# Patient Record
Sex: FEMALE | Race: WHITE | NOT HISPANIC OR LATINO | Employment: PART TIME | URBAN - METROPOLITAN AREA
[De-identification: names, ages, dates, MRNs, and addresses within clinical notes are randomized per-mention and may not be internally consistent; named-entity substitution may affect disease eponyms.]

---

## 2017-08-14 ENCOUNTER — APPOINTMENT (OUTPATIENT)
Dept: URGENT CARE | Facility: CLINIC | Age: 37
End: 2017-08-14
Payer: OTHER MISCELLANEOUS

## 2017-08-14 ENCOUNTER — TRANSCRIBE ORDERS (OUTPATIENT)
Dept: URGENT CARE | Facility: CLINIC | Age: 37
End: 2017-08-14

## 2017-08-14 ENCOUNTER — APPOINTMENT (OUTPATIENT)
Dept: RADIOLOGY | Facility: CLINIC | Age: 37
End: 2017-08-14
Payer: OTHER MISCELLANEOUS
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
## 2017-08-14 PROCEDURE — 72072 X-RAY EXAM THORAC SPINE 3VWS: CPT

## 2017-08-14 PROCEDURE — 99284 EMERGENCY DEPT VISIT MOD MDM: CPT

## 2017-08-14 PROCEDURE — 72040 X-RAY EXAM NECK SPINE 2-3 VW: CPT

## 2017-08-14 PROCEDURE — G0383 LEV 4 HOSP TYPE B ED VISIT: HCPCS

## 2017-08-17 ENCOUNTER — APPOINTMENT (OUTPATIENT)
Dept: URGENT CARE | Facility: CLINIC | Age: 37
End: 2017-08-17
Payer: OTHER MISCELLANEOUS

## 2017-08-17 PROCEDURE — 99213 OFFICE O/P EST LOW 20 MIN: CPT

## 2017-08-24 ENCOUNTER — APPOINTMENT (OUTPATIENT)
Dept: URGENT CARE | Facility: CLINIC | Age: 37
End: 2017-08-24
Payer: OTHER MISCELLANEOUS

## 2017-08-24 PROCEDURE — 99213 OFFICE O/P EST LOW 20 MIN: CPT

## 2017-08-31 ENCOUNTER — ALLSCRIPTS OFFICE VISIT (OUTPATIENT)
Dept: OTHER | Facility: OTHER | Age: 37
End: 2017-08-31

## 2018-01-13 VITALS
DIASTOLIC BLOOD PRESSURE: 80 MMHG | HEIGHT: 68 IN | WEIGHT: 212 LBS | HEART RATE: 70 BPM | BODY MASS INDEX: 32.13 KG/M2 | OXYGEN SATURATION: 97 % | RESPIRATION RATE: 16 BRPM | SYSTOLIC BLOOD PRESSURE: 130 MMHG

## 2021-03-08 ENCOUNTER — OFFICE VISIT (OUTPATIENT)
Dept: FAMILY MEDICINE CLINIC | Facility: CLINIC | Age: 41
End: 2021-03-08
Payer: COMMERCIAL

## 2021-03-08 VITALS
HEART RATE: 93 BPM | BODY MASS INDEX: 32.86 KG/M2 | WEIGHT: 216.8 LBS | OXYGEN SATURATION: 99 % | TEMPERATURE: 98.1 F | HEIGHT: 68 IN | SYSTOLIC BLOOD PRESSURE: 124 MMHG | DIASTOLIC BLOOD PRESSURE: 70 MMHG

## 2021-03-08 DIAGNOSIS — J45.40 MODERATE PERSISTENT ASTHMA, UNSPECIFIED WHETHER COMPLICATED: ICD-10-CM

## 2021-03-08 DIAGNOSIS — E78.2 MIXED HYPERLIPIDEMIA: ICD-10-CM

## 2021-03-08 DIAGNOSIS — Z00.00 WELLNESS EXAMINATION: Primary | ICD-10-CM

## 2021-03-08 PROCEDURE — 3725F SCREEN DEPRESSION PERFORMED: CPT | Performed by: FAMILY MEDICINE

## 2021-03-08 PROCEDURE — 99395 PREV VISIT EST AGE 18-39: CPT | Performed by: FAMILY MEDICINE

## 2021-03-08 PROCEDURE — 99213 OFFICE O/P EST LOW 20 MIN: CPT | Performed by: FAMILY MEDICINE

## 2021-03-08 RX ORDER — ALBUTEROL SULFATE 90 UG/1
2 AEROSOL, METERED RESPIRATORY (INHALATION) EVERY 6 HOURS PRN
Qty: 1 INHALER | Refills: 5 | Status: SHIPPED | OUTPATIENT
Start: 2021-03-08 | End: 2022-05-15

## 2021-03-08 RX ORDER — FEXOFENADINE HCL AND PSEUDOEPHEDRINE HCI 180; 240 MG/1; MG/1
1 TABLET, EXTENDED RELEASE ORAL DAILY
COMMUNITY

## 2021-03-08 NOTE — PROGRESS NOTES
[facility]        NAME: Eleni Pa is a 46 y o  female  : 1980    MRN: [id number]  DATE: 2021  TIME: 9:26 AM    Assessment and Plan   Wellness examination [Z00 00]  1  Wellness examination     2  Moderate persistent asthma, unspecified whether complicated     3  Mixed hyperlipidemia  Comprehensive metabolic panel    Lipid Panel with Direct LDL reflex    Comprehensive metabolic panel    Lipid Panel with Direct LDL reflex         Patient Instructions     Patient Instructions   Trial advair--ck labs          Chief Complaint     Chief Complaint   Patient presents with    Follow-up     MM--med-refills--Asthma    Annual Exam     HM         History of Present Illness       F/u assessed med cond      Review of Systems   Review of Systems   Constitutional: Negative for fatigue, fever and unexpected weight change  HENT: Negative for congestion, sinus pain and sore throat  Eyes: Negative for visual disturbance  Respiratory: Negative for shortness of breath and wheezing  Cardiovascular: Negative for chest pain and palpitations  Gastrointestinal: Negative for abdominal pain, nausea and vomiting  Musculoskeletal: Negative  Negative for arthralgias and myalgias  Neurological: Negative for syncope, weakness and numbness  Psychiatric/Behavioral: Negative  Negative for confusion, dysphoric mood and suicidal ideas           Current Medications       Current Outpatient Medications:     albuterol (2 5 mg/3 mL) 0 083 % nebulizer solution, Take 3 mL by nebulization every 6 (six) hours as needed for wheezing, Disp: 75 mL, Rfl: 12    albuterol (PROVENTIL HFA,VENTOLIN HFA) 90 mcg/act inhaler, Inhale 2 puffs every 6 (six) hours as needed for wheezing, Disp: , Rfl:     cetirizine (ZyrTEC) 10 mg tablet, Take 10 mg by mouth daily, Disp: , Rfl:     fexofenadine-pseudoephedrine (ALLEGRA-D 24) 180-240 MG per 24 hr tablet, Take 1 tablet by mouth daily, Disp: , Rfl: Current Allergies     Allergies as of 03/08/2021    (No Known Allergies)            The following portions of the patient's history were reviewed and updated as appropriate: allergies, current medications, past family history, past medical history, past social history, past surgical history and problem list      Past Medical History:   Diagnosis Date    Asthma        No past surgical history on file  Family History   Problem Relation Age of Onset    Diabetes Mother     Hypertension Sister     Coronary artery disease Sister     Breast cancer Maternal Grandmother          Medications have been verified  Objective   /70   Pulse 93   Temp 98 1 °F (36 7 °C) (Temporal)   Ht 5' 7 5" (1 715 m)   Wt 98 3 kg (216 lb 12 8 oz)   SpO2 99%   BMI 33 45 kg/m²        Physical Exam     Physical Exam  Constitutional:       Appearance: She is well-developed  HENT:      Right Ear: Ear canal normal  Tympanic membrane is not injected  Left Ear: Ear canal normal  Tympanic membrane is not injected  Nose: Nose normal    Eyes:      General:         Right eye: No discharge  Left eye: No discharge  Conjunctiva/sclera: Conjunctivae normal       Pupils: Pupils are equal, round, and reactive to light  Neck:      Musculoskeletal: Normal range of motion and neck supple  Thyroid: No thyromegaly  Cardiovascular:      Rate and Rhythm: Normal rate and regular rhythm  Heart sounds: Normal heart sounds  No murmur  Pulmonary:      Effort: Pulmonary effort is normal  No respiratory distress  Breath sounds: Normal breath sounds  No wheezing  Abdominal:      General: Bowel sounds are normal  There is no distension  Palpations: Abdomen is soft  Tenderness: There is no abdominal tenderness  Musculoskeletal: Normal range of motion  Lymphadenopathy:      Cervical: No cervical adenopathy  Skin:     General: Skin is warm and dry     Neurological:      Mental Status: She is alert and oriented to person, place, and time  She is not disoriented  Sensory: No sensory deficit  Gait: Gait normal       Deep Tendon Reflexes: Reflexes are normal and symmetric  Psychiatric:         Speech: Speech normal          Behavior: Behavior normal          Thought Content:  Thought content normal          Judgment: Judgment normal

## 2021-03-08 NOTE — PROGRESS NOTES
HPI:  Eleni Pa is a 46 y o  female here for her yearly health maintenance exam    Patient Active Problem List   Diagnosis    Asthma     Past Medical History:   Diagnosis Date    Asthma        1  Advanced Directive: n     2  Durable Power of  for Healthcare: n     3  Social History:           Drug and alcohol History: n                  4  Immunizations up to date: y                 Lifestyle:                           Healthy Diet:y                          Alcohol Use:n                          Tobacco Use:n                          Regular exercise:y                          Weight concerns:y                               5  Over the past 2 weeks, how often have you been bothered by the following:              Little interest or pleasure in doing things:n              Felling down, depressed or hopeless:n       Current Outpatient Medications   Medication Sig Dispense Refill    albuterol (2 5 mg/3 mL) 0 083 % nebulizer solution Take 3 mL by nebulization every 6 (six) hours as needed for wheezing 75 mL 12    albuterol (PROVENTIL HFA,VENTOLIN HFA) 90 mcg/act inhaler Inhale 2 puffs every 6 (six) hours as needed for wheezing      cetirizine (ZyrTEC) 10 mg tablet Take 10 mg by mouth daily      fexofenadine-pseudoephedrine (ALLEGRA-D 24) 180-240 MG per 24 hr tablet Take 1 tablet by mouth daily       No current facility-administered medications for this visit  No Known Allergies  There is no immunization history for the selected administration types on file for this patient  Patient Care Team:  Lui Pascual MD as PCP - General (Family Medicine)    Review of Systems    Physical Exam :  Physical Exam      Assessment and Plan:  1  Wellness examination     2  Moderate persistent asthma, unspecified whether complicated     3   Mixed hyperlipidemia  Comprehensive metabolic panel    Lipid Panel with Direct LDL reflex    Comprehensive metabolic panel    Lipid Panel with Direct LDL reflex Health Maintenance Due   Topic Date Due    BMI: Followup Plan  03/08/2000    Annual Physical  03/08/2000    DTaP,Tdap,and Td Vaccines (1 - Tdap) 03/08/2003    Cervical Cancer Screening  03/09/2018 no

## 2021-03-09 LAB
ALBUMIN SERPL-MCNC: 4.4 G/DL (ref 3.8–4.8)
ALBUMIN/GLOB SERPL: 1.6 {RATIO} (ref 1.2–2.2)
ALP SERPL-CCNC: 34 IU/L (ref 39–117)
ALT SERPL-CCNC: 14 IU/L (ref 0–32)
AST SERPL-CCNC: 18 IU/L (ref 0–40)
BILIRUB SERPL-MCNC: 0.4 MG/DL (ref 0–1.2)
BUN SERPL-MCNC: 17 MG/DL (ref 6–20)
BUN/CREAT SERPL: 19 (ref 9–23)
CALCIUM SERPL-MCNC: 9.7 MG/DL (ref 8.7–10.2)
CHLORIDE SERPL-SCNC: 105 MMOL/L (ref 96–106)
CHOLEST SERPL-MCNC: 153 MG/DL (ref 100–199)
CO2 SERPL-SCNC: 25 MMOL/L (ref 20–29)
CREAT SERPL-MCNC: 0.88 MG/DL (ref 0.57–1)
GLOBULIN SER-MCNC: 2.8 G/DL (ref 1.5–4.5)
GLUCOSE SERPL-MCNC: 105 MG/DL (ref 65–99)
HDLC SERPL-MCNC: 65 MG/DL
LDLC SERPL CALC-MCNC: 77 MG/DL (ref 0–99)
LDLC/HDLC SERPL: 1.2 RATIO (ref 0–3.2)
POTASSIUM SERPL-SCNC: 4.8 MMOL/L (ref 3.5–5.2)
PROT SERPL-MCNC: 7.2 G/DL (ref 6–8.5)
SL AMB EGFR AFRICAN AMERICAN: 96 ML/MIN/1.73
SL AMB EGFR NON AFRICAN AMERICAN: 83 ML/MIN/1.73
SL AMB VLDL CHOLESTEROL CALC: 11 MG/DL (ref 5–40)
SODIUM SERPL-SCNC: 140 MMOL/L (ref 134–144)
TRIGL SERPL-MCNC: 50 MG/DL (ref 0–149)

## 2021-03-10 ENCOUNTER — TELEPHONE (OUTPATIENT)
Dept: FAMILY MEDICINE CLINIC | Facility: CLINIC | Age: 41
End: 2021-03-10

## 2021-03-10 NOTE — TELEPHONE ENCOUNTER
----- Message from Johan Loera MD sent at 3/10/2021  7:30 AM EST -----  MM/LABS 12MOS---current labs NL

## 2021-03-25 ENCOUNTER — OFFICE VISIT (OUTPATIENT)
Dept: FAMILY MEDICINE CLINIC | Facility: CLINIC | Age: 41
End: 2021-03-25
Payer: COMMERCIAL

## 2021-03-25 VITALS
HEIGHT: 68 IN | DIASTOLIC BLOOD PRESSURE: 72 MMHG | BODY MASS INDEX: 32.89 KG/M2 | TEMPERATURE: 98.2 F | OXYGEN SATURATION: 96 % | WEIGHT: 217 LBS | HEART RATE: 93 BPM | SYSTOLIC BLOOD PRESSURE: 122 MMHG

## 2021-03-25 DIAGNOSIS — S86.911A STRAIN OF RIGHT KNEE, INITIAL ENCOUNTER: Primary | ICD-10-CM

## 2021-03-25 DIAGNOSIS — M71.21 SYNOVIAL CYST OF RIGHT POPLITEAL SPACE: ICD-10-CM

## 2021-03-25 PROCEDURE — 1036F TOBACCO NON-USER: CPT | Performed by: FAMILY MEDICINE

## 2021-03-25 PROCEDURE — 99213 OFFICE O/P EST LOW 20 MIN: CPT | Performed by: FAMILY MEDICINE

## 2021-03-25 PROCEDURE — 3008F BODY MASS INDEX DOCD: CPT | Performed by: FAMILY MEDICINE

## 2021-03-25 RX ORDER — PREDNISONE 20 MG/1
TABLET ORAL
Qty: 15 TABLET | Refills: 0 | Status: SHIPPED | OUTPATIENT
Start: 2021-03-25

## 2021-03-25 NOTE — PROGRESS NOTES
[address]        NAME: Denia Rahman is a 46 y o  female  : 1980    MRN: [id number]  DATE: 2021  TIME: 11:27 AM    Assessment and Plan   Strain of right knee, initial encounter [S86 911A]  1  Strain of right knee, initial encounter  XR knee 3 vw right non injury    predniSONE 20 mg tablet   2  Synovial cyst of right popliteal space  XR knee 3 vw right non injury    predniSONE 20 mg tablet   3  BMI 32 0-32 9,adult         No problem-specific Assessment & Plan notes found for this encounter  Patient Instructions     Patient Instructions     This appears to be an overuse strain of your right knee  There is also possibly of a Baker cyst behind the knee  I would give trial of ibuprofen 600 mg 3 times daily for 3-4 days  If her seeing no difference than get the prescription filled for the prednisone  If that still does not solve the problem, I would check an x-ray and refer you to orthopedics  Weight Management   AMBULATORY CARE:   Why it is important to manage your weight:  Being overweight increases your risk of health conditions such as heart disease, high blood pressure, type 2 diabetes, and certain types of cancer  It can also increase your risk for osteoarthritis, sleep apnea, and other respiratory problems  Aim for a slow, steady weight loss  Even a small amount of weight loss can lower your risk of health problems  How to lose weight safely:  A safe and healthy way to lose weight is to eat fewer calories and get regular exercise  · You can lose up about 1 pound a week by decreasing the number of calories you eat by 500 calories each day  You can decrease calories by eating smaller portion sizes or by cutting out high-calorie foods  Read labels to find out how many calories are in the foods you eat  · You can also burn calories with exercise such as walking, swimming, or biking   You will be more likely to keep weight off if you make these changes part of your lifestyle  Exercise at least 30 minutes per day on most days of the week  You can also fit in more physical activity by taking the stairs instead of the elevator or parking farther away from stores  Ask your healthcare provider about the best exercise plan for you  Healthy meal plan for weight management:  A healthy meal plan includes a variety of foods, contains fewer calories, and helps you stay healthy  A healthy meal plan includes the following:     · Eat whole-grain foods more often  A healthy meal plan should contain fiber  Fiber is the part of grains, fruits, and vegetables that is not broken down by your body  Whole-grain foods are healthy and provide extra fiber in your diet  Some examples of whole-grain foods are whole-wheat breads and pastas, oatmeal, brown rice, and bulgur  · Eat a variety of vegetables every day  Include dark, leafy greens such as spinach, kale, hayley greens, and mustard greens  Eat yellow and orange vegetables such as carrots, sweet potatoes, and winter squash  · Eat a variety of fruits every day  Choose fresh or canned fruit (canned in its own juice or light syrup) instead of juice  Fruit juice has very little or no fiber  · Eat low-fat dairy foods  Drink fat-free (skim) milk or 1% milk  Eat fat-free yogurt and low-fat cottage cheese  Try low-fat cheeses such as mozzarella and other reduced-fat cheeses  · Choose meat and other protein foods that are low in fat  Choose beans or other legumes such as split peas or lentils  Choose fish, skinless poultry (chicken or turkey), or lean cuts of red meat (beef or pork)  Before you cook meat or poultry, cut off any visible fat  · Use less fat and oil  Try baking foods instead of frying them  Add less fat, such as margarine, sour cream, regular salad dressing and mayonnaise to foods  Eat fewer high-fat foods   Some examples of high-fat foods include french fries, doughnuts, ice cream, and cakes     · Eat fewer sweets  Limit foods and drinks that are high in sugar  This includes candy, cookies, regular soda, and sweetened drinks  Ways to decrease calories:   · Eat smaller portions  ? Use a small plate with smaller servings  ? Do not eat second helpings  ? When you eat at a restaurant, ask for a box and place half of your meal in the box before you eat  ? Share an entrée with someone else  · Replace high-calorie snacks with healthy, low-calorie snacks  ? Choose fresh fruit, vegetables, fat-free rice cakes, or air-popped popcorn instead of potato chips, nuts, or chocolate  ? Choose water or calorie-free drinks instead of soda or sweetened drinks  · Do not shop for groceries when you are hungry  You may be more likely to make unhealthy food choices  Take a grocery list of healthy foods and shop after you have eaten  · Eat regular meals  Do not skip meals  Skipping meals can lead to overeating later in the day  This can make it harder for you to lose weight  Eat a healthy snack in place of a meal if you do not have time to eat a regular meal  Talk with a dietitian to help you create a meal plan and schedule that is right for you  Other things to consider as you try to lose weight:   · Be aware of situations that may give you the urge to overeat, such as eating while watching television  Find ways to avoid these situations  For example, read a book, go for a walk, or do crafts  · Meet with a weight loss support group or friends who are also trying to lose weight  This may help you stay motivated to continue working on your weight loss goals  © Copyright [address] Information is for End User's use only and may not be sold, redistributed or otherwise used for commercial purposes  All illustrations and images included in CareNotes® are the copyrighted property of A D A M [employer] Inc  or Robbie Nelson  The above information is an  only   It is not intended as medical advice for individual conditions or treatments  Talk to your doctor, nurse or pharmacist before following any medical regimen to see if it is safe and effective for you  Chief Complaint     Chief Complaint   Patient presents with    Joint Swelling         History of Present Illness         Patient comes in today with swelling of the right knee  She had no recognized injury  Previous meniscal surgery by Dr Justin Villegas 6-8 years ago  She has not had a problem with it since  She does stand a lot at work  She does not feel in the laxity  There is some medial tenderness  Review of Systems   Review of Systems   Cardiovascular: Negative for chest pain and leg swelling  Musculoskeletal: Positive for gait problem and joint swelling  Skin: Negative for rash and wound  Neurological: Negative for dizziness, light-headedness and headaches  Current Medications       Current Outpatient Medications:     albuterol (2 5 mg/3 mL) 0 083 % nebulizer solution, Take 3 mL by nebulization every 6 (six) hours as needed for wheezing, Disp: 75 mL, Rfl: 12    albuterol (ProAir HFA) 90 mcg/act inhaler, Inhale 2 puffs every 6 (six) hours as needed for wheezing, Disp: 1 Inhaler, Rfl: 5    albuterol (PROVENTIL HFA,VENTOLIN HFA) 90 mcg/act inhaler, Inhale 2 puffs every 6 (six) hours as needed for wheezing, Disp: , Rfl:     cetirizine (ZyrTEC) 10 mg tablet, Take 10 mg by mouth daily, Disp: , Rfl:     fexofenadine-pseudoephedrine (ALLEGRA-D 24) 180-240 MG per 24 hr tablet, Take 1 tablet by mouth daily, Disp: , Rfl:     fluticasone-salmeterol (ADVAIR, WIXELA) 250-50 mcg/dose inhaler, Inhale 1 puff 2 (two) times a day Rinse mouth after use , Disp: 1 Inhaler, Rfl: 5    predniSONE 20 mg tablet, 1 tab twice daily for 5 days, then 1 tab daily for 5 days  , Disp: 15 tablet, Rfl: 0    Current Allergies     Allergies as of 03/25/2021    (No Known Allergies)            The following portions of the patient's history were reviewed and updated as appropriate: allergies, current medications, past family history, past medical history, past social history, past surgical history and problem list      Past Medical History:   Diagnosis Date    Asthma        History reviewed  No pertinent surgical history  Family History   Problem Relation Age of Onset    Diabetes Mother     Hypertension Sister     Coronary artery disease Sister     Breast cancer Maternal Grandmother          Medications have been verified  Objective   /72   Pulse 93   Temp 98 2 °F (36 8 °C) (Temporal)   Ht 5' 8" (1 727 m)   Wt 98 4 kg (217 lb)   LMP 03/14/2021 (Exact Date)   SpO2 96%   BMI 32 99 kg/m²        Physical Exam     Physical Exam  Constitutional:       General: She is not in acute distress  Appearance: Normal appearance  She is not ill-appearing  Cardiovascular:      Rate and Rhythm: Normal rate and regular rhythm  Heart sounds: Normal heart sounds  Pulmonary:      Effort: Pulmonary effort is normal  No respiratory distress  Breath sounds: Normal breath sounds  Musculoskeletal:      Right knee: She exhibits swelling, effusion and erythema  She exhibits no LCL laxity and no MCL laxity  Tenderness found  Medial joint line tenderness noted  Skin:     Findings: No erythema or rash  Neurological:      Mental Status: She is alert  BMI Counseling: Body mass index is 32 99 kg/m²  The BMI is above normal  Nutrition recommendations include reducing portion sizes, decreasing overall calorie intake and 3-5 servings of fruits/vegetables daily

## 2021-03-26 ENCOUNTER — HOSPITAL ENCOUNTER (OUTPATIENT)
Dept: RADIOLOGY | Facility: HOSPITAL | Age: 41
Discharge: HOME | End: 2021-03-26
Payer: COMMERCIAL

## 2021-03-26 DIAGNOSIS — M71.21 SYNOVIAL CYST OF RIGHT POPLITEAL SPACE: ICD-10-CM

## 2021-03-26 DIAGNOSIS — S86.911A STRAIN OF RIGHT KNEE, INITIAL ENCOUNTER: ICD-10-CM

## 2021-03-26 PROCEDURE — 73562 X-RAY EXAM OF KNEE 3: CPT

## 2021-04-01 ENCOUNTER — TELEPHONE (OUTPATIENT)
Dept: FAMILY MEDICINE CLINIC | Facility: CLINIC | Age: 41
End: 2021-04-01

## 2021-04-01 NOTE — TELEPHONE ENCOUNTER
Left message as per Dr Blanche Walker -- to call office with any ?'s      ----- Message from Eric Mahmood MD sent at 4/1/2021 10:22 AM EDT -----  Call patient with lab result-mild arthritis, o/w normal

## 2021-04-29 ENCOUNTER — ANNUAL EXAM (OUTPATIENT)
Dept: OBGYN CLINIC | Facility: CLINIC | Age: 41
End: 2021-04-29
Payer: COMMERCIAL

## 2021-04-29 VITALS
BODY MASS INDEX: 34.56 KG/M2 | SYSTOLIC BLOOD PRESSURE: 108 MMHG | DIASTOLIC BLOOD PRESSURE: 60 MMHG | WEIGHT: 220.2 LBS | HEIGHT: 67 IN

## 2021-04-29 DIAGNOSIS — Z01.419 ROUTINE GYNECOLOGICAL EXAMINATION: Primary | ICD-10-CM

## 2021-04-29 DIAGNOSIS — Z12.4 SCREENING FOR MALIGNANT NEOPLASM OF THE CERVIX: ICD-10-CM

## 2021-04-29 DIAGNOSIS — Z12.31 BREAST CANCER SCREENING BY MAMMOGRAM: ICD-10-CM

## 2021-04-29 PROBLEM — J45.990 ASTHMA, EXERCISE INDUCED: Status: ACTIVE | Noted: 2017-08-31

## 2021-04-29 PROCEDURE — 87624 HPV HI-RISK TYP POOLED RSLT: CPT | Performed by: OBSTETRICS & GYNECOLOGY

## 2021-04-29 PROCEDURE — G0145 SCR C/V CYTO,THINLAYER,RESCR: HCPCS | Performed by: OBSTETRICS & GYNECOLOGY

## 2021-04-29 PROCEDURE — 3008F BODY MASS INDEX DOCD: CPT | Performed by: FAMILY MEDICINE

## 2021-04-29 PROCEDURE — S0610 ANNUAL GYNECOLOGICAL EXAMINA: HCPCS | Performed by: OBSTETRICS & GYNECOLOGY

## 2021-04-29 NOTE — PROGRESS NOTES
[address], [facility] [address]    ASSESSMENT/PLAN: Yevgeniy Murdock is a 46 y o   who presents for annual gynecologic exam     Encounter for routine gynecologic examination  - Routine well woman exam completed today  - Cervical Cancer Screening: Current ASCCP Guidelines reviewed  Last Pap: 2015  Next Pap Due:today  - Contraceptive counseling discussed  Current contraception: tubal:   - Breast Cancer Screening: Last Mammogram Not on file, next year  - Colorectal cancer screening was not ordered  - The following were reviewed in today's visit: breast self exam and mammography screening ordered    Additional problems addressed during this visit:  1  Routine gynecological examination    2  Breast cancer screening by mammogram  -     Mammo screening bilateral w 3d & cad; Future; Expected date: 2022    3  Screening for malignant neoplasm of the cervix  -     Cervical or vaginal cytopath, thin prep        CC: Annual Gynecologic Examination    HPI: Yevgeniy Murdock is a 46 y o   who presents for annual gynecologic examination  HPI    The following portions of the patient's history were reviewed and updated as appropriate: She  has a past medical history of Asthma and  2 para 2  She  has a past surgical history that includes  section ( , ) and Knee surgery ()  Her family history includes Breast cancer in her maternal grandmother; Coronary artery disease in her sister; Diabetes in her mother; Hypertension in her sister  She  reports that she has never smoked  She has never used smokeless tobacco  She reports current alcohol use  She reports that she does not use drugs    Current Outpatient Medications   Medication Sig Dispense Refill    albuterol (ProAir HFA) 90 mcg/act inhaler Inhale 2 puffs every 6 (six) hours as needed for wheezing 1 Inhaler 5    albuterol (PROVENTIL HFA,VENTOLIN HFA) 90 mcg/act inhaler Inhale 2 puffs every 6 (six) hours as needed for wheezing      fexofenadine-pseudoephedrine (ALLEGRA-D 24) 180-240 MG per 24 hr tablet Take 1 tablet by mouth daily      fluticasone-salmeterol (ADVAIR, WIXELA) 250-50 mcg/dose inhaler Inhale 1 puff 2 (two) times a day Rinse mouth after use  1 Inhaler 5    albuterol (2 5 mg/3 mL) 0 083 % nebulizer solution Take 3 mL by nebulization every 6 (six) hours as needed for wheezing (Patient not taking: Reported on 4/29/2021) 75 mL 12    cetirizine (ZyrTEC) 10 mg tablet Take 10 mg by mouth daily      predniSONE 20 mg tablet 1 tab twice daily for 5 days, then 1 tab daily for 5 days  (Patient not taking: Reported on 4/29/2021) 15 tablet 0     No current facility-administered medications for this visit  She has No Known Allergies       Review of Systems      Objective:  /60 (BP Location: Left arm, Patient Position: Sitting, Cuff Size: Large)   Ht 5' 7 25" (1 708 m)   Wt 99 9 kg (220 lb 3 2 oz)   LMP 04/12/2021 (Exact Date)   Breastfeeding No   BMI 34 23 kg/m²    Physical Exam  Vitals signs reviewed  Exam conducted with a chaperone present  Constitutional:       Appearance: Normal appearance  She is normal weight  HENT:      Head: Normocephalic  Eyes:      Conjunctiva/sclera: Conjunctivae normal       Pupils: Pupils are equal, round, and reactive to light  Neck:      Musculoskeletal: Normal range of motion  Thyroid: No thyroid mass, thyromegaly or thyroid tenderness  Cardiovascular:      Rate and Rhythm: Normal rate  Pulmonary:      Effort: Pulmonary effort is normal    Abdominal:      General: Abdomen is flat  Bowel sounds are normal       Palpations: Abdomen is soft  Genitourinary:     General: Normal vulva  Exam position: Lithotomy position  Labia:         Right: No lesion  Left: No lesion  Urethra: No prolapse  Vagina: Normal  No lesions  Cervix: No discharge, lesion or cervical bleeding        Uterus: Normal  Not enlarged and not tender  Adnexa: Right adnexa normal and left adnexa normal         Right: No mass or tenderness  Left: No mass or tenderness  Lymphadenopathy:      Cervical: No cervical adenopathy  Neurological:      Mental Status: She is alert

## 2021-05-04 LAB
HPV HR 12 DNA CVX QL NAA+PROBE: NEGATIVE
HPV16 DNA CVX QL NAA+PROBE: NEGATIVE
HPV18 DNA CVX QL NAA+PROBE: NEGATIVE

## 2021-05-06 LAB
LAB AP GYN PRIMARY INTERPRETATION: NORMAL
Lab: NORMAL

## 2021-11-16 DIAGNOSIS — J45.40 MODERATE PERSISTENT ASTHMA, UNSPECIFIED WHETHER COMPLICATED: ICD-10-CM

## 2023-01-09 DIAGNOSIS — J45.40 MODERATE PERSISTENT ASTHMA, UNSPECIFIED WHETHER COMPLICATED: ICD-10-CM

## 2023-01-10 RX ORDER — FLUTICASONE PROPIONATE AND SALMETEROL 250; 50 UG/1; UG/1
1 POWDER RESPIRATORY (INHALATION) 2 TIMES DAILY
Qty: 60 BLISTER | Refills: 10 | Status: SHIPPED | OUTPATIENT
Start: 2023-01-10

## 2023-01-10 RX ORDER — ALBUTEROL SULFATE 90 UG/1
2 AEROSOL, METERED RESPIRATORY (INHALATION) EVERY 6 HOURS PRN
Qty: 18 G | Refills: 0 | Status: SHIPPED | OUTPATIENT
Start: 2023-01-10

## 2023-01-13 ENCOUNTER — OFFICE VISIT (OUTPATIENT)
Dept: FAMILY MEDICINE CLINIC | Facility: CLINIC | Age: 43
End: 2023-01-13

## 2023-01-13 ENCOUNTER — TELEPHONE (OUTPATIENT)
Dept: FAMILY MEDICINE CLINIC | Facility: CLINIC | Age: 43
End: 2023-01-13

## 2023-01-13 VITALS
OXYGEN SATURATION: 98 % | DIASTOLIC BLOOD PRESSURE: 73 MMHG | BODY MASS INDEX: 37.92 KG/M2 | TEMPERATURE: 97.2 F | SYSTOLIC BLOOD PRESSURE: 118 MMHG | WEIGHT: 241.6 LBS | HEIGHT: 67 IN | HEART RATE: 76 BPM

## 2023-01-13 DIAGNOSIS — J45.20 MILD INTERMITTENT ASTHMA WITHOUT COMPLICATION: Primary | ICD-10-CM

## 2023-01-13 NOTE — PROGRESS NOTES
Assessment/Plan:    No problem-specific Assessment & Plan notes found for this encounter  Diagnoses and all orders for this visit:    Mild intermittent asthma without complication          Subjective:   Chief Complaint   Patient presents with   • Physical Exam        Patient ID: Madalyn Martel is a 38 y o  female  HPI    The following portions of the patient's history were reviewed and updated as appropriate: allergies, current medications, past family history, past medical history, past social history, past surgical history and problem list     Review of Systems   Constitutional: Negative for fatigue, fever and unexpected weight change  HENT: Negative for congestion, sinus pain and sore throat  Eyes: Negative for visual disturbance  Respiratory: Negative for shortness of breath and wheezing  Cardiovascular: Negative for chest pain and palpitations  Gastrointestinal: Negative for abdominal pain, nausea and vomiting  Musculoskeletal: Negative  Negative for arthralgias and myalgias  Neurological: Negative for syncope, weakness and numbness  Psychiatric/Behavioral: Negative  Negative for confusion, dysphoric mood and suicidal ideas  Objective:  Vitals:    01/13/23 0907   BP: 118/73   BP Location: Left arm   Patient Position: Sitting   Cuff Size: Standard   Pulse: 76   Temp: (!) 97 2 °F (36 2 °C)   TempSrc: Tympanic   SpO2: 98%   Weight: 110 kg (241 lb 9 6 oz)   Height: 5' 7 25" (1 708 m)      Physical Exam  Constitutional:       Appearance: She is well-developed  HENT:      Right Ear: Ear canal normal  Tympanic membrane is not injected  Left Ear: Ear canal normal  Tympanic membrane is not injected  Nose: Nose normal    Eyes:      General:         Right eye: No discharge  Left eye: No discharge  Conjunctiva/sclera: Conjunctivae normal       Pupils: Pupils are equal, round, and reactive to light  Neck:      Thyroid: No thyromegaly     Cardiovascular: Rate and Rhythm: Normal rate and regular rhythm  Heart sounds: Normal heart sounds  No murmur heard  Pulmonary:      Effort: Pulmonary effort is normal  No respiratory distress  Breath sounds: Normal breath sounds  No wheezing  Abdominal:      General: Bowel sounds are normal  There is no distension  Palpations: Abdomen is soft  Tenderness: There is no abdominal tenderness  Musculoskeletal:         General: Normal range of motion  Cervical back: Normal range of motion and neck supple  Lymphadenopathy:      Cervical: No cervical adenopathy  Skin:     General: Skin is warm and dry  Neurological:      Mental Status: She is alert and oriented to person, place, and time  She is not disoriented  Sensory: No sensory deficit  Gait: Gait normal       Deep Tendon Reflexes: Reflexes are normal and symmetric  Psychiatric:         Speech: Speech normal          Behavior: Behavior normal          Thought Content:  Thought content normal          Judgment: Judgment normal

## 2023-05-01 ENCOUNTER — OFFICE VISIT (OUTPATIENT)
Dept: PODIATRY | Facility: CLINIC | Age: 43
End: 2023-05-01

## 2023-05-01 ENCOUNTER — TELEPHONE (OUTPATIENT)
Dept: OTHER | Facility: OTHER | Age: 43
End: 2023-05-01

## 2023-05-01 VITALS
WEIGHT: 228.8 LBS | DIASTOLIC BLOOD PRESSURE: 81 MMHG | HEIGHT: 67 IN | BODY MASS INDEX: 35.91 KG/M2 | SYSTOLIC BLOOD PRESSURE: 123 MMHG | HEART RATE: 81 BPM

## 2023-05-01 DIAGNOSIS — M79.671 RIGHT FOOT PAIN: ICD-10-CM

## 2023-05-01 DIAGNOSIS — M24.571 EQUINUS CONTRACTURE OF RIGHT ANKLE: ICD-10-CM

## 2023-05-01 DIAGNOSIS — M72.2 PLANTAR FASCIITIS: Primary | ICD-10-CM

## 2023-05-01 NOTE — PROGRESS NOTES
PATIENT:  Nona Patterson  1980       ASSESSMENT:     1  Plantar fasciitis        2  Equinus contracture of right ankle        3  Right foot pain                  PLAN:  1  Patient was counseled and educated on the condition and the diagnosis  2  The exam and symptoms are consistent with plantar fasciitis  The diagnosis, treatment options and prognosis were discussed with the patient  3  Instructed supportive care, home exercise, icing, and proper footwear/ arch support  Discussed possible injection depending on the progress  4  Sent her for night splint  Imaging: I have personally reviewed pertinent films in PACS  Labs, pathology, and Other Studies: I have personally reviewed pertinent reports  Subjective:       HPI  The patient presents with chief complaint of right foot pain  She has pain in right arch with post-static dyskinesia for about 2 weeks  Pain is 9 out of 10 in the morning  It is better during the day  She had plantar fasciitis for 3 months last year  It resolved on its own  The patient does not recall any injury  Denied any swelling  No associated numbness or paresthesia  No significant weakness or dysfunction  The following portions of the patient's history were reviewed and updated as appropriate: allergies, current medications, past family history, past medical history, past social history, past surgical history and problem list   All pertinent labs and images were reviewed  Past Medical History  Past Medical History:   Diagnosis Date    Asthma      2 para 2        Past Surgical History  Past Surgical History:   Procedure Laterality Date     SECTION   , 2010,     KNEE SURGERY          Allergies:  Patient has no known allergies      Medications:  Current Outpatient Medications   Medication Sig Dispense Refill    albuterol (PROVENTIL HFA,VENTOLIN HFA) 90 mcg/act inhaler Inhale 2 puffs every 6 "(six) hours as needed for wheezing 18 g 0    cetirizine (ZyrTEC) 10 mg tablet Take 10 mg by mouth daily      Fluticasone-Salmeterol (Advair) 250-50 mcg/dose inhaler Inhale 1 puff 2 (two) times a day Rinse mouth after use  60 blister 10     No current facility-administered medications for this visit  Social History:  Social History     Socioeconomic History    Marital status: /Civil Union     Spouse name: Not on file    Number of children: 2    Years of education: Not on file    Highest education level: Not on file   Occupational History    Not on file   Tobacco Use    Smoking status: Never    Smokeless tobacco: Never   Vaping Use    Vaping Use: Never used   Substance and Sexual Activity    Alcohol use: Yes     Comment: social    Drug use: No    Sexual activity: Not on file   Other Topics Concern    Not on file   Social History Narrative    Not on file     Social Determinants of Health     Financial Resource Strain: Not on file   Food Insecurity: Not on file   Transportation Needs: Not on file   Physical Activity: Not on file   Stress: Not on file   Social Connections: Not on file   Intimate Partner Violence: Not on file   Housing Stability: Not on file          Review of Systems   Constitutional: Negative for chills and fever  Respiratory: Negative  Cardiovascular: Negative  Gastrointestinal: Negative  Musculoskeletal: Negative for gait problem  Neurological: Negative for weakness and numbness  Objective:      /81   Pulse 81   Ht 5' 7 2\" (1 707 m)   Wt 104 kg (228 lb 12 8 oz)   BMI 35 62 kg/m²          Physical Exam  Constitutional:       General: She is not in acute distress  Appearance: She is not toxic-appearing or diaphoretic  Cardiovascular:      Rate and Rhythm: Normal rate and regular rhythm  Pulses: Normal pulses  Dorsalis pedis pulses are 2+ on the right side and 2+ on the left side          Posterior tibial pulses are 2+ on the " right side and 2+ on the left side  Pulmonary:      Effort: Pulmonary effort is normal  No respiratory distress  Musculoskeletal:         General: Tenderness present  No swelling or signs of injury  Right lower leg: No edema  Left lower leg: No edema  Right foot: No foot drop  Left foot: No foot drop  Comments: Tight medial band of plantar fascia right arch with pain on palpation  No pain around right heel  Tight achilles / calf with equinus right ankle  Skin:     General: Skin is warm  Capillary Refill: Capillary refill takes less than 2 seconds  Coloration: Skin is not cyanotic  Findings: No abscess, ecchymosis or wound  Nails: There is no clubbing  Neurological:      General: No focal deficit present  Mental Status: She is alert and oriented to person, place, and time  Cranial Nerves: No cranial nerve deficit  Sensory: No sensory deficit  Motor: No weakness  Coordination: Coordination normal    Psychiatric:         Mood and Affect: Mood normal          Behavior: Behavior normal          Thought Content:  Thought content normal          Judgment: Judgment normal

## 2023-05-01 NOTE — TELEPHONE ENCOUNTER
Patient would like a call back from the  in hopes that the office has an earlier appointment for today    Please call back

## 2023-07-01 DIAGNOSIS — J45.40 MODERATE PERSISTENT ASTHMA, UNSPECIFIED WHETHER COMPLICATED: ICD-10-CM

## 2023-07-05 RX ORDER — ALBUTEROL SULFATE 90 UG/1
2 AEROSOL, METERED RESPIRATORY (INHALATION) EVERY 6 HOURS PRN
Qty: 18 G | Refills: 0 | Status: SHIPPED | OUTPATIENT
Start: 2023-07-05

## 2023-07-26 DIAGNOSIS — J45.40 MODERATE PERSISTENT ASTHMA, UNSPECIFIED WHETHER COMPLICATED: ICD-10-CM

## 2023-07-26 RX ORDER — ALBUTEROL SULFATE 90 UG/1
2 AEROSOL, METERED RESPIRATORY (INHALATION) EVERY 6 HOURS PRN
Qty: 9 G | Refills: 5 | Status: SHIPPED | OUTPATIENT
Start: 2023-07-26

## 2023-08-24 ENCOUNTER — OFFICE VISIT (OUTPATIENT)
Dept: PODIATRY | Facility: CLINIC | Age: 43
End: 2023-08-24

## 2023-08-24 VITALS
SYSTOLIC BLOOD PRESSURE: 133 MMHG | WEIGHT: 240 LBS | DIASTOLIC BLOOD PRESSURE: 85 MMHG | HEART RATE: 67 BPM | BODY MASS INDEX: 37.67 KG/M2 | HEIGHT: 67 IN

## 2023-08-24 DIAGNOSIS — M76.821 INSUFFICIENCY OF RIGHT POSTERIOR TIBIAL TENDON: Primary | ICD-10-CM

## 2023-08-24 DIAGNOSIS — M21.41 PES PLANUS OF RIGHT FOOT: ICD-10-CM

## 2023-08-24 PROCEDURE — 99213 OFFICE O/P EST LOW 20 MIN: CPT | Performed by: PODIATRIST

## 2023-08-24 RX ORDER — METHYLPREDNISOLONE 4 MG/1
TABLET ORAL
Qty: 1 EACH | Refills: 0 | Status: SHIPPED | OUTPATIENT
Start: 2023-08-24

## 2023-08-24 NOTE — PROGRESS NOTES
Assessment/Plan:         Diagnoses and all orders for this visit:    Insufficiency of right posterior tibial tendon  -     Ambulatory referral to Physical Therapy; Future  -     methylPREDNISolone 4 MG tablet therapy pack; Use as directed on package    Pes planus of right foot  -     Ambulatory referral to Physical Therapy; Future  -     methylPREDNISolone 4 MG tablet therapy pack; Use as directed on package      Diagnosis and options discussed with patient  Patient agreeable to the plan as stated below    Patients symptoms point to stage 1/2 PTTD. Her right arch is collapsing and the PTT is insufficient. She has no insurance so I did not take an XR but clearly this is a tendon issue. Immobilize with CAM boot. Can be purchased off [employer] for 38 dollars. Medrol taper    Reduce activity 50-75%    Custom orthotics highly recommended. Prescription given. Can consider nam brace but this will be expensive    RTC 6w eeks    Subjective:      Patient ID: Michelle Lui is a 41 y.o. female. Patient had plantar fascitis last fall that took a few months to resolve. Last May it came back. She saw Dr. Cristiano Villarreal who recommended more aggressive PT for a tight ankle. He recommended a night splint. When she walks her foot feels like its curving inward. HE pain is medial ankle on the right. The following portions of the patient's history were reviewed and updated as appropriate: allergies, current medications, past family history, past medical history, past social history, past surgical history and problem list.    Review of Systems    Constitutional: Negative. Respiratory: Negative for cough and shortness of breath. Gastrointestinal: Negative for diarrhea, nausea and vomiting. Musculoskeletal: ankle pain. Skin: Negative for rash or wound. Neurological: Negative for weakness, numbness and headaches.          Objective:      /85   Pulse 67   Ht 5' 7" (1.702 m)   Wt 109 kg (240 lb)   BMI 37.59 kg/m²          Physical Exam  Vitals reviewed. Constitutional:       Appearance: She is obese. Cardiovascular:      Rate and Rhythm: Normal rate. Pulses: Normal pulses. Musculoskeletal:         General: Swelling, tenderness (right PTT MMT 3/5 and very painful with stressed inversion. ) and deformity (right PTTD with collapse on stance. Calcaneal valgus noted. ) present. Skin:     Capillary Refill: Capillary refill takes less than 2 seconds. Findings: No erythema or rash. Neurological:      Mental Status: She is alert and oriented to person, place, and time. Sensory: No sensory deficit.

## 2023-08-24 NOTE — PATIENT INSTRUCTIONS
Ankle Exercises   AMBULATORY CARE:   What you need to know about ankle exercises: Ankle exercises help strengthen your ankle and improve its function after injury. These are beginning exercises. Ask your healthcare provider if you need to see a physical therapist for more advanced exercises. General guidelines for ankle exercises:   Do these exercises 3 to 5 days a week, or as directed by your healthcare provider. Ask if you should do the exercises on each ankle. Do the exercises in the order that your healthcare provider recommends. This will help prevent swelling, chronic pain, and reinjury. Start with range of motion exercises. Then move to strengthening exercises, and finally to balancing exercises. Warm up before you do ankle exercises. Walk or ride a stationary bike for 5 to 10 minutes to prepare your ankle for movement. Stop if you feel pain. It is normal to feel some discomfort at first but you should not feel pain. Tell your doctor or physical therapist if you have pain while you exercise. Regular exercise will help decrease your discomfort over time. How to perform range of motion exercises safely:  Begin with range of motion exercises to improve flexibility. Ask your healthcare provider when you can progress to strengthening exercises. Ankle alphabet:  Sit on a chair so that your feet do not touch the floor. Use your big toe to write each letter of the alphabet. Use only your foot and ankle, and keep your movements small. Do 2 sets. Calf stretches:      Sitting calf stretches with a towel:  Sit on the floor with both legs out straight in front of you. Loop a towel around the ball of your injured foot. Grasp the ends of the towel and pull it toward you. Keep your leg and back straight. Do not lean forward as you pull the towel. Hold for 30 seconds. Then relax for 30 seconds. Do 2 sets of 10.          Standing calf stretches:  Stand facing a wall with the foot that is not injured forward and your knee slightly bent. Keep the leg with the injured foot straight and behind you with your toes pointed in slightly. With both heels flat on the floor, press your hips forward. Do not arch your back. Hold for 30 seconds, and then relax for 30 seconds. Do 2 sets of 10. Repeat with your leg bent. Do 2 sets of 10. How to perform strengthening exercises safely:  After you can perform range of motion exercises without pain, you may begin strengthening exercises. Ask your healthcare provider when you can progress to balancing exercises. Ankle movement in 4 directions:  Sit on the floor with your legs straight in front of you. Keep your heels on the floor for support. Dorsiflexion:  Begin with your toes pointing straight up. Pull your toes toward your body. Slowly return to the starting position. Do 3 sets of 5. Plantar flexion:  Begin with your toes pointing straight up. Push your toes away from your body. Slowly return to the starting position. Do 3 sets of 5. Inversion:  Begin with your toes pointing straight up. Push your toes inward, toward each other. Slowly return to the starting position. Do 3 sets of 5. Eversion:  Begin with your toes pointing straight up. Push your toes outward, away from each other. Slowly return to the starting position. Do 3 sets of 5. Toe curls with a towel:  Sit on a chair so that both of your feet are flat on the floor. Place a small towel on the floor in front of your injured foot. Grab the center of the towel with your toes and curl the towel toward you. Relax and repeat. Do 1 set of 5. [place] pick-ups:  Sit on a chair so that both of your feet are flat on the floor. Place 20 marbles on the floor in front of your injured foot. Use your toes to  one marble at a time and place it into a bowl. Repeat until you have picked up all the marbles. Do 1 set.      Heel raises:      Single leg heel raises:  Stand with your weight evenly on both feet. Hold on to a chair or a wall for balance. Lift the foot that is not injured off the floor so all your weight is placed on your injured foot. Raise the heel of your injured foot as high as you can. Slowly lower your heel to the floor. Do 1 set of 10. Double leg heel raises:  Stand with your weight evenly on both feet. Hold on to a chair or a wall for balance. Raise both of your heels as high as you can. Slowly lower your heels to the floor. Do 1 set of 10. Heel and toe walks:      Heel walks:  Begin in a standing position. Lift your toes off the floor and walk on your heels. Keep your toes lifted as high as possible. Do 2 sets of 10. Toe walks:  Begin in a standing position. Lift your heels off the floor and walk on the balls and toes of your feet. Keep your heels lifted as high as possible. Do 2 sets of 10. How to perform a balance exercise safely:  After you can perform strengthening exercises without pain, you may do this beginning balancing exercise. Ask your healthcare provider for more advanced balance exercises. Single leg stance:  Stand with your weight evenly on both feet, or hold on to a chair or a wall. Do not lean to the side. Lift the foot that is not injured off the floor so all your weight is placed on your injured foot. Balance on your injured foot. Ask your healthcare provider how long to hold this position. Call your doctor or physical therapist if:   You have new pain, or your pain becomes worse. You have questions or concerns about your condition, care, or exercise program.    © Copyright [employer] 2022 Information is for End User's use only and may not be sold, redistributed or otherwise used for commercial purposes. The above information is an  only. It is not intended as medical advice for individual conditions or treatments.  Talk to your doctor, nurse or pharmacist before following any medical regimen to see if it is safe and effective for you.

## 2023-09-05 ENCOUNTER — OFFICE VISIT (OUTPATIENT)
Dept: PHYSICAL THERAPY | Facility: CLINIC | Age: 43
End: 2023-09-05

## 2023-09-05 DIAGNOSIS — M76.821 INSUFFICIENCY OF RIGHT POSTERIOR TIBIAL TENDON: ICD-10-CM

## 2023-09-05 DIAGNOSIS — M21.41 PES PLANUS OF RIGHT FOOT: ICD-10-CM

## 2023-09-05 NOTE — PROGRESS NOTES
PT Evaluation     Today's date: 2023  Patient name: Nusrat Cobb  : 1980  MRN: [id number]  Referring provider: Dia Carias PT  Dx:   Encounter Diagnosis     ICD-10-CM    1. Insufficiency of right posterior tibial tendon  M76.821 Ambulatory referral to Physical Therapy      2. Pes planus of right foot  M21.41 Ambulatory referral to Physical Therapy                     Assessment/Plan    Subjective Evaluation    History of Present Illness  Onset date: chronic. Mechanism of injury: Pt reports that she began with plantar fascitis in her R foot about a year ago. Pt reports that she then developed post tib tendonitis and her podiatrist told her she has a fallen arch. Pt attempted over the counter inserts which helped a little. Pt is now wearing a CAM boot at home. Pt presents for custom orthotics.           Not a recurrent problem   Quality of life: good    Patient Goals  Patient goals for therapy: decreased pain    Pain  Current pain ratin  At best pain ratin  At worst pain rating: 10  Location: R foot  Quality: sharp  Relieving factors: rest  Aggravating factors: standing and walking  Progression: no change      Diagnostic Tests  No diagnostic tests performed  Treatments  Previous treatment: medication        Objective    Ankle strength WNL b/l    AROM  R ankle DF 0 deg  L ankle DF 2 deg    PROM  R ankle DF 2 deg  L ankle DF 5 deg    Severe pronation with ambulation (R>L), valgus knees    Severe collapse with squat (R>L)    LMI R: 18 deg  LMI L: 12 deg           Precautions: none      Manuals             Scanned for custom orthotics  MD                                                     Neuro Re-Ed                                                                                                        Ther Ex                                                                                                                     Ther Activity                                       Gait Training                                       Modalities

## 2023-10-02 ENCOUNTER — OFFICE VISIT (OUTPATIENT)
Dept: PHYSICAL THERAPY | Facility: CLINIC | Age: 43
End: 2023-10-02
Payer: COMMERCIAL

## 2023-10-02 DIAGNOSIS — M76.821 INSUFFICIENCY OF RIGHT POSTERIOR TIBIAL TENDON: Primary | ICD-10-CM

## 2023-10-02 DIAGNOSIS — M21.41 PES PLANUS OF RIGHT FOOT: ICD-10-CM

## 2023-10-02 PROCEDURE — L3010 FOOT LONGITUDINAL ARCH SUPPO: HCPCS | Performed by: PHYSICAL THERAPIST

## 2023-10-04 ENCOUNTER — OFFICE VISIT (OUTPATIENT)
Dept: PODIATRY | Facility: CLINIC | Age: 43
End: 2023-10-04

## 2023-10-04 VITALS
BODY MASS INDEX: 37.67 KG/M2 | SYSTOLIC BLOOD PRESSURE: 131 MMHG | HEIGHT: 67 IN | DIASTOLIC BLOOD PRESSURE: 79 MMHG | WEIGHT: 240 LBS | HEART RATE: 67 BPM

## 2023-10-04 DIAGNOSIS — M21.41 PES PLANUS OF RIGHT FOOT: ICD-10-CM

## 2023-10-04 DIAGNOSIS — M76.821 INSUFFICIENCY OF RIGHT POSTERIOR TIBIAL TENDON: Primary | ICD-10-CM

## 2023-10-04 PROCEDURE — 99213 OFFICE O/P EST LOW 20 MIN: CPT | Performed by: PODIATRIST

## 2023-10-04 NOTE — PATIENT INSTRUCTIONS
Ankle Exercises   AMBULATORY CARE:   What you need to know about ankle exercises: Ankle exercises help strengthen your ankle and improve its function after injury. These are beginning exercises. Ask your healthcare provider if you need to see a physical therapist for more advanced exercises. General guidelines for ankle exercises:   Do these exercises 3 to 5 days a week, or as directed by your healthcare provider. Ask if you should do the exercises on each ankle. Do the exercises in the order that your healthcare provider recommends. This will help prevent swelling, chronic pain, and reinjury. Start with range of motion exercises. Then move to strengthening exercises, and finally to balancing exercises. Warm up before you do ankle exercises. Walk or ride a stationary bike for 5 to 10 minutes to prepare your ankle for movement. Stop if you feel pain. It is normal to feel some discomfort at first but you should not feel pain. Tell your doctor or physical therapist if you have pain while you exercise. Regular exercise will help decrease your discomfort over time. How to perform range of motion exercises safely:  Begin with range of motion exercises to improve flexibility. Ask your healthcare provider when you can progress to strengthening exercises. Ankle alphabet:  Sit on a chair so that your feet do not touch the floor. Use your big toe to write each letter of the alphabet. Use only your foot and ankle, and keep your movements small. Do 2 sets. Calf stretches:      Sitting calf stretches with a towel:  Sit on the floor with both legs out straight in front of you. Loop a towel around the ball of your injured foot. Grasp the ends of the towel and pull it toward you. Keep your leg and back straight. Do not lean forward as you pull the towel. Hold for 30 seconds. Then relax for 30 seconds. Do 2 sets of 10.          Standing calf stretches:  Stand facing a wall with the foot that is not injured forward and your knee slightly bent. Keep the leg with the injured foot straight and behind you with your toes pointed in slightly. With both heels flat on the floor, press your hips forward. Do not arch your back. Hold for 30 seconds, and then relax for 30 seconds. Do 2 sets of 10. Repeat with your leg bent. Do 2 sets of 10. How to perform strengthening exercises safely:  After you can perform range of motion exercises without pain, you may begin strengthening exercises. Ask your healthcare provider when you can progress to balancing exercises. Ankle movement in 4 directions:  Sit on the floor with your legs straight in front of you. Keep your heels on the floor for support. Dorsiflexion:  Begin with your toes pointing straight up. Pull your toes toward your body. Slowly return to the starting position. Do 3 sets of 5. Plantar flexion:  Begin with your toes pointing straight up. Push your toes away from your body. Slowly return to the starting position. Do 3 sets of 5. Inversion:  Begin with your toes pointing straight up. Push your toes inward, toward each other. Slowly return to the starting position. Do 3 sets of 5. Eversion:  Begin with your toes pointing straight up. Push your toes outward, away from each other. Slowly return to the starting position. Do 3 sets of 5. Toe curls with a towel:  Sit on a chair so that both of your feet are flat on the floor. Place a small towel on the floor in front of your injured foot. Grab the center of the towel with your toes and curl the towel toward you. Relax and repeat. Do 1 set of 5. [place] pick-ups:  Sit on a chair so that both of your feet are flat on the floor. Place 20 marbles on the floor in front of your injured foot. Use your toes to  one marble at a time and place it into a bowl. Repeat until you have picked up all the marbles. Do 1 set.      Heel raises:      Single leg heel raises:  Stand with your weight evenly on both feet. Hold on to a chair or a wall for balance. Lift the foot that is not injured off the floor so all your weight is placed on your injured foot. Raise the heel of your injured foot as high as you can. Slowly lower your heel to the floor. Do 1 set of 10. Double leg heel raises:  Stand with your weight evenly on both feet. Hold on to a chair or a wall for balance. Raise both of your heels as high as you can. Slowly lower your heels to the floor. Do 1 set of 10. Heel and toe walks:      Heel walks:  Begin in a standing position. Lift your toes off the floor and walk on your heels. Keep your toes lifted as high as possible. Do 2 sets of 10. Toe walks:  Begin in a standing position. Lift your heels off the floor and walk on the balls and toes of your feet. Keep your heels lifted as high as possible. Do 2 sets of 10. How to perform a balance exercise safely:  After you can perform strengthening exercises without pain, you may do this beginning balancing exercise. Ask your healthcare provider for more advanced balance exercises. Single leg stance:  Stand with your weight evenly on both feet, or hold on to a chair or a wall. Do not lean to the side. Lift the foot that is not injured off the floor so all your weight is placed on your injured foot. Balance on your injured foot. Ask your healthcare provider how long to hold this position. Call your doctor or physical therapist if:   You have new pain, or your pain becomes worse. You have questions or concerns about your condition, care, or exercise program.    © Copyright [employer] 2023 Information is for End User's use only and may not be sold, redistributed or otherwise used for commercial purposes. The above information is an  only. It is not intended as medical advice for individual conditions or treatments.  Talk to your doctor, nurse or pharmacist before following any medical regimen to see if it is safe and effective for you.

## 2023-10-04 NOTE — PROGRESS NOTES
Assessment/Plan:       Diagnoses and all orders for this visit:    Insufficiency of right posterior tibial tendon    Pes planus of right foot      Diagnosis and options discussed with patient  Patient agreeable to the plan as stated below    PAin right PTT improved with immobilization, home therapy and medrol taper. Her new orthotics fit well and she feels more support and less pain. I am optimistic this will reduce her pain as she does not have insurance. MRI, formal PT, and surgery would be quite costly. If her pain returns, if orthotics and home PT do not reduce her pain sufficiently, she would likely need an MRI for surgical planning of the flatfoot and stage 2 PTTD. Her only other options would be a restrictive Kip brace. Subjective:      Patient ID: Zina Moise is a 41 y.o. female. PAtient was diagnosed with stage 2 PTTD and pes planus. SHe wore a CAM boot for 4 weeks and just transitioned to a custom orthotic. The following portions of the patient's history were reviewed and updated as appropriate: allergies, current medications, past family history, past medical history, past social history, past surgical history and problem list.    Review of Systems    Constitutional: Negative. Respiratory: Negative for cough and shortness of breath. Gastrointestinal: Negative for diarrhea, nausea and vomiting. Musculoskeletal: foot and ankle pain, improving  Skin: Negative for rash or wound. Neurological: Negative for weakness, numbness and headaches. Objective:      /79 (BP Location: Left arm, Patient Position: Sitting, Cuff Size: Large)   Pulse 67   Ht 5' 7" (1.702 m)   Wt 109 kg (240 lb)   BMI 37.59 kg/m²          Physical Exam  Vitals reviewed. Constitutional:       General: She is not in acute distress. Appearance: She is not toxic-appearing. Cardiovascular:      Pulses: Normal pulses.            Dorsalis pedis pulses are 2+ on the right side and 2+ on the left side. Musculoskeletal:         General: Tenderness present. Right foot: Deformity present. Left foot: Deformity present. Feet:    Skin:     Findings: No erythema or rash. Neurological:      Mental Status: She is alert. Sensory: No sensory deficit.

## 2023-11-16 ENCOUNTER — ANNUAL EXAM (OUTPATIENT)
Dept: OBGYN CLINIC | Facility: CLINIC | Age: 43
End: 2023-11-16

## 2023-11-16 VITALS
BODY MASS INDEX: 35.01 KG/M2 | DIASTOLIC BLOOD PRESSURE: 78 MMHG | WEIGHT: 231 LBS | SYSTOLIC BLOOD PRESSURE: 118 MMHG | HEIGHT: 68 IN

## 2023-11-16 DIAGNOSIS — Z01.419 ROUTINE GYNECOLOGICAL EXAMINATION: Primary | ICD-10-CM

## 2023-11-16 DIAGNOSIS — Z80.3 FAMILY HISTORY OF BREAST CANCER IN FEMALE: ICD-10-CM

## 2023-11-16 DIAGNOSIS — Z12.31 BREAST CANCER SCREENING BY MAMMOGRAM: ICD-10-CM

## 2023-11-16 PROCEDURE — 99396 PREV VISIT EST AGE 40-64: CPT | Performed by: OBSTETRICS & GYNECOLOGY

## 2023-11-16 NOTE — PROGRESS NOTES
[address]   [address] [place] [address], [address]    ASSESSMENT/PLAN: Jim Edward is a 41 y.o.  who presents for annual gynecologic exam.    Encounter for routine gynecologic examination  - Routine well woman exam completed today. - Cervical Cancer Screening: Current ASCCP Guidelines reviewed. Last Pap: 2021 . Next Pap Due:   - HPV Vaccination status: Not immunized  - Contraceptive counseling discussed. Current contraception: tubal  - Breast Cancer Screening: Last Mammogram Not on file, ordered  - Colorectal cancer screening was not ordered. - The following were reviewed in today's visit: breast self exam, mammography screening ordered, adequate intake of calcium and vitamin D, and exercise    Additional problems addressed during this visit:  1. Routine gynecological examination    2. Breast cancer screening by mammogram  -     Mammo screening bilateral w 3d & cad; Future    3. Family history of breast cancer in female  -     Mammo screening bilateral w 3d & cad; Future        CC:  Annual Gynecologic Examination    HPI: Jim Edward is a 41 y.o.  who presents for annual gynecologic examination. HPI    The following portions of the patient's history were reviewed and updated as appropriate: She  has a past medical history of Asthma and  2 para 2. She  has a past surgical history that includes  section ( , ) and Knee surgery (). Her family history includes Breast cancer in her maternal grandmother; Diabetes in her mother; Heart attack in her paternal grandfather; Heart disease in her father and maternal grandfather; Hypertension in her father; No Known Problems in her daughter, daughter, paternal grandmother, sister, and sister; Skin cancer in her maternal grandfather. She  reports that she has never smoked. She has never used smokeless tobacco. She reports current alcohol use. She reports that she does not use drugs.   Current Outpatient Medications   Medication Sig Dispense Refill    albuterol (PROVENTIL HFA,VENTOLIN HFA) 90 mcg/act inhaler INHALE 2 PUFFS BY MOUTH EVERY 6 HOURS AS NEEDED FOR WHEEZING 9 g 5    cetirizine (ZyrTEC) 10 mg tablet Take 10 mg by mouth daily      Fluticasone-Salmeterol (Advair) 250-50 mcg/dose inhaler Inhale 1 puff 2 (two) times a day Rinse mouth after use. 60 blister 10    methylPREDNISolone 4 MG tablet therapy pack Use as directed on package 1 each 0     No current facility-administered medications for this visit. She has No Known Allergies. .    Review of Systems      Objective:  /78 (BP Location: Left arm, Patient Position: Sitting, Cuff Size: Large)   Ht 5' 7.5" (1.715 m)   Wt 105 kg (231 lb)   LMP 11/06/2023 (Exact Date)   BMI 35.65 kg/m²    Physical Exam      PE:  General Appearance: alert and oriented, in no acute distress. HEENT: PERRL, thyroid without masses or tenderness  Breast: No masses, tenderness, skin changes, nipple D/C or axillary or supraclavicular adenopathy  Abdomen: Soft, non-tender, non-distended, no masses, no rebound or guarding. Pelvic:       External genitalia: Normal appearance, no abnormal pigmentation, no lesions or masses. Normal Bartholin's and [place]'s. Urinary system: Urethral meatus normal, bladder non-tender. Vaginal: normal mucosa without prolapse or lesions. Normal-appearing physiologic discharge      Cervix: Normal-appearing, well-epithelialized, no gross lesions or masses No cervical motion tenderness. Adnexa: No adnexal masses or tenderness noted. Uterus: Normal-sized, regular contour, midline, mobile, no uterine tenderness. Extremities: Normal range of motion.    Skin: normal, no rash or abnormalities  Neurologic: alert, oriented x3  Psychiatric: Appropriate affect, mood stable, cooperative with exam.

## 2023-11-28 ENCOUNTER — HOSPITAL ENCOUNTER (OUTPATIENT)
Dept: MAMMOGRAPHY | Facility: IMAGING CENTER | Age: 43
Discharge: HOME | End: 2023-11-28

## 2023-11-28 VITALS — WEIGHT: 226 LBS | HEIGHT: 68 IN | BODY MASS INDEX: 34.25 KG/M2

## 2023-11-28 DIAGNOSIS — Z12.31 BREAST CANCER SCREENING BY MAMMOGRAM: ICD-10-CM

## 2023-11-28 DIAGNOSIS — Z80.3 FAMILY HISTORY OF BREAST CANCER IN FEMALE: ICD-10-CM

## 2023-11-28 PROCEDURE — 77067 SCR MAMMO BI INCL CAD: CPT

## 2023-11-28 PROCEDURE — 77063 BREAST TOMOSYNTHESIS BI: CPT

## 2023-12-14 ENCOUNTER — HOSPITAL ENCOUNTER (OUTPATIENT)
Dept: MAMMOGRAPHY | Facility: CLINIC | Age: 43
Discharge: HOME | End: 2023-12-14
Payer: COMMERCIAL

## 2023-12-14 ENCOUNTER — HOSPITAL ENCOUNTER (OUTPATIENT)
Dept: ULTRASOUND IMAGING | Facility: CLINIC | Age: 43
Discharge: HOME | End: 2023-12-14
Payer: COMMERCIAL

## 2023-12-14 DIAGNOSIS — R92.8 ABNORMAL MAMMOGRAM: ICD-10-CM

## 2023-12-14 PROCEDURE — 77065 DX MAMMO INCL CAD UNI: CPT

## 2023-12-14 PROCEDURE — G0279 TOMOSYNTHESIS, MAMMO: HCPCS

## 2023-12-14 PROCEDURE — 76642 ULTRASOUND BREAST LIMITED: CPT

## 2024-01-30 ENCOUNTER — PATIENT MESSAGE (OUTPATIENT)
Dept: FAMILY MEDICINE CLINIC | Facility: CLINIC | Age: 44
End: 2024-01-30

## 2024-01-30 DIAGNOSIS — J45.40 MODERATE PERSISTENT ASTHMA, UNSPECIFIED WHETHER COMPLICATED: ICD-10-CM

## 2024-02-07 RX ORDER — FLUTICASONE PROPIONATE AND SALMETEROL 250; 50 UG/1; UG/1
1 POWDER RESPIRATORY (INHALATION) 2 TIMES DAILY
Qty: 60 BLISTER | Refills: 10 | Status: SHIPPED | OUTPATIENT
Start: 2024-02-07

## 2024-06-18 ENCOUNTER — HOSPITAL ENCOUNTER (OUTPATIENT)
Dept: MAMMOGRAPHY | Facility: CLINIC | Age: 44
Discharge: HOME | End: 2024-06-18
Payer: COMMERCIAL

## 2024-06-18 ENCOUNTER — HOSPITAL ENCOUNTER (OUTPATIENT)
Dept: ULTRASOUND IMAGING | Facility: CLINIC | Age: 44
Discharge: HOME | End: 2024-06-18
Payer: COMMERCIAL

## 2024-06-18 VITALS — HEIGHT: 67 IN | BODY MASS INDEX: 37.67 KG/M2 | WEIGHT: 240 LBS

## 2024-06-18 DIAGNOSIS — R92.8 ABNORMAL FINDINGS ON DIAGNOSTIC IMAGING OF BREAST: ICD-10-CM

## 2024-06-18 PROCEDURE — G0279 TOMOSYNTHESIS, MAMMO: HCPCS

## 2024-06-18 PROCEDURE — 76642 ULTRASOUND BREAST LIMITED: CPT

## 2024-06-18 PROCEDURE — 77065 DX MAMMO INCL CAD UNI: CPT

## 2024-10-14 ENCOUNTER — TELEPHONE (OUTPATIENT)
Age: 44
End: 2024-10-14

## 2024-10-14 NOTE — TELEPHONE ENCOUNTER
Caller: Ana Roger    Doctor and/or Office: Dr. Cole/[facility]#: [phone]    Escalation: Surgery/Is interested in scheduling SX for May next year for her right foot issue. Asking for call back about when to be seen to start the process-she will need an MRI per . Please call back and advise/schedule. She is thinking May so she has help at home. If she could pick a date that would be great.  Thanks

## 2024-12-04 ENCOUNTER — OFFICE VISIT (OUTPATIENT)
Dept: PODIATRY | Facility: CLINIC | Age: 44
End: 2024-12-04

## 2024-12-04 ENCOUNTER — APPOINTMENT (OUTPATIENT)
Dept: RADIOLOGY | Age: 44
End: 2024-12-04

## 2024-12-04 VITALS
HEIGHT: 67 IN | SYSTOLIC BLOOD PRESSURE: 132 MMHG | BODY MASS INDEX: 38.45 KG/M2 | DIASTOLIC BLOOD PRESSURE: 84 MMHG | HEART RATE: 89 BPM | WEIGHT: 245 LBS

## 2024-12-04 DIAGNOSIS — M79.671 PAIN IN RIGHT FOOT: ICD-10-CM

## 2024-12-04 DIAGNOSIS — M72.2 PLANTAR FASCIITIS: ICD-10-CM

## 2024-12-04 DIAGNOSIS — M21.41 PES PLANUS OF RIGHT FOOT: ICD-10-CM

## 2024-12-04 DIAGNOSIS — M76.821 INSUFFICIENCY OF RIGHT POSTERIOR TIBIAL TENDON: Primary | ICD-10-CM

## 2024-12-04 PROCEDURE — 99214 OFFICE O/P EST MOD 30 MIN: CPT | Performed by: PODIATRIST

## 2024-12-04 PROCEDURE — 73610 X-RAY EXAM OF ANKLE: CPT

## 2024-12-04 PROCEDURE — 73630 X-RAY EXAM OF FOOT: CPT

## 2024-12-04 NOTE — PROGRESS NOTES
"Name: Ana Roger      : 1980      MRN: [id number]  Encounter Provider: Jarvis Cole DPM  Encounter Date: 2024   Encounter department: [facility]  :  Assessment & Plan  Pain in right foot    Orders:    XR foot 3+ vw right; Future    MRI ankle/heel right  wo contrast; Future    Insufficiency of right posterior tibial tendon    Orders:    MRI ankle/heel right  wo contrast; Future    Pes planus of right foot    Orders:    MRI ankle/heel right  wo contrast; Future    Plantar fasciitis    Orders:    MRI ankle/heel right  wo contrast; Future    Diagnosis and options discussed with patient  Patient agreeable to the plan as stated below    XR reviewed with patient. See my report below. Severe collapse of rearfoot, suspected ligament instability on top of PTTD.    Stage 2/3 PTTD with pes planus. She is still in severe pain despite PT, orthotics, and immobilization. I have ordered an MRI, I think she has severe PTT tendonosis secondary to pes planus. THe MRI is for surgical planning. I will call her with the results of the MRI and discuss surgical plan or further workup.     History of Present Illness     HPI  Ana Roger is a 44 y.o. female who presents for [facility]. She has stage 2 PTTD and pes planus. She got orthotics which are helping but after maybe an hour pf walking the pain returns. She is limping and cannot get through the day. The pain and ankle swelling is getting worse.       Review of Systems  As stated in HPI, otherwise normal    Medical History Reviewed by provider this encounter:  Tobacco  Allergies  Meds  Problems  Med Hx  Surg Hx  Fam Hx           Objective   /84   Pulse 89   Ht 5' 7\" (1.702 m)   Wt 111 kg (245 lb)   BMI 38.37 kg/m²      Physical Exam  Vitals reviewed.   Constitutional:       Appearance: She is obese. She is not ill-appearing.   Cardiovascular:      Rate and Rhythm: Normal rate.      Pulses: Normal pulses.       "     Dorsalis pedis pulses are 2+ on the right side.        Posterior tibial pulses are 2+ on the right side.   Pulmonary:      Effort: No respiratory distress.   Musculoskeletal:         General: Swelling, tenderness and deformity present.      Right foot: Decreased range of motion. Deformity present.   Feet:      Right foot:      Protective Sensation:   10 sites sensed.      Skin integrity: Skin integrity normal.      Left foot:      Protective Sensation:   10 sites sensed.   Skin:     Capillary Refill: Capillary refill takes less than 2 seconds.      Findings: No bruising.   Neurological:      Mental Status: She is alert and oriented to person, place, and time.      Sensory: No sensory deficit.      Gait: Gait abnormal (antalgic).     Right foot and ankle exam  Equinus: present  Stance: calcaneal valgus with medial column collapse  PTTD: strength attenuated, pain along the PTT from proximal malleolus to insertion    XRay 3 views of the right foot and ankle personally read by Dr. Cole in office today and discussed with patient:    There is no acute fracture or dislocation.  No significant degenerative changes.  No lytic or blastic osseous lesion.  Soft tissues are unremarkable.  Talar declination angle: 16.5  Calcaneal inclination angle: 15  Mearys angle: 27  Ankle mortise: anatomic  Slight cortical reaction to medial fibula at syndesmosis

## 2024-12-09 ENCOUNTER — ANNUAL EXAM (OUTPATIENT)
Dept: OBGYN CLINIC | Facility: CLINIC | Age: 44
End: 2024-12-09

## 2024-12-09 VITALS
DIASTOLIC BLOOD PRESSURE: 84 MMHG | BODY MASS INDEX: 39.43 KG/M2 | SYSTOLIC BLOOD PRESSURE: 126 MMHG | WEIGHT: 251.2 LBS | HEIGHT: 67 IN

## 2024-12-09 DIAGNOSIS — Z12.4 SCREENING FOR MALIGNANT NEOPLASM OF THE CERVIX: ICD-10-CM

## 2024-12-09 DIAGNOSIS — Z01.419 ROUTINE GYNECOLOGICAL EXAMINATION: Primary | ICD-10-CM

## 2024-12-09 DIAGNOSIS — Z12.31 ENCOUNTER FOR SCREENING MAMMOGRAM FOR MALIGNANT NEOPLASM OF BREAST: ICD-10-CM

## 2024-12-09 PROCEDURE — 99396 PREV VISIT EST AGE 40-64: CPT | Performed by: OBSTETRICS & GYNECOLOGY

## 2024-12-09 NOTE — PROGRESS NOTES
[facility] - [place] [id number] [id number]    ASSESSMENT/PLAN: Ana Roger is a 44 y.o.  who presents for annual gynecologic exam.    Encounter for routine gynecologic examination  - Routine well woman exam completed today.  - Cervical Cancer Screening: Current ASCCP Guidelines reviewed. Last Pap: 2021 . Next Pap Due: today  - HPV Vaccination status: Not immunized  - Contraceptive counseling discussed.  Current contraception: tubal  - Breast Cancer Screening: Last Mammogram 2023, ordered  - Colorectal cancer screening was not ordered.  - The following were reviewed in today's visit: breast self exam, mammography screening ordered, adequate intake of calcium and vitamin D, and exercise    Additional problems addressed during this visit:  1. Routine gynecological examination  2. Encounter for screening mammogram for malignant neoplasm of breast  -     Mammo screening bilateral w 3d and cad; Future  3. Screening for malignant neoplasm of the cervix  -     Thinprep Tis Pap and HPV mRNA E6/E7 Reflex HPV 16,18/45      CC:  Annual Gynecologic Examination    HPI: Ana Roger is a 44 y.o.  who presents for annual gynecologic examination.  HPI    The following portions of the patient's history were reviewed and updated as appropriate: She  has a past medical history of Asthma and  2 para 2.  She  has a past surgical history that includes  section ( , ); Knee surgery (); and Tubal ligation.  Her family history includes Breast cancer in her maternal grandmother; Diabetes in her mother; Heart attack in her paternal grandfather; Heart disease in her father and maternal grandfather; Hypertension in her father; No Known Problems in her daughter, daughter, maternal aunt, paternal grandmother, sister, and sister; Skin cancer in her maternal grandfather.  She  reports that she has never smoked. She has never used smokeless tobacco. She reports  "current alcohol use. She reports that she does not use drugs.  Current Outpatient Medications   Medication Sig Dispense Refill    albuterol (PROVENTIL HFA,VENTOLIN HFA) 90 mcg/act inhaler INHALE 2 PUFFS BY MOUTH EVERY 6 HOURS AS NEEDED FOR WHEEZING 9 g 5    cetirizine (ZyrTEC) 10 mg tablet Take 10 mg by mouth daily      Fluticasone-Salmeterol (Advair) 250-50 mcg/dose inhaler Inhale 1 puff 2 (two) times a day Rinse mouth after use. 60 blister 10     No current facility-administered medications for this visit.     She has no known allergies..    Review of Systems      Objective:  /84 (BP Location: Left arm, Patient Position: Sitting, Cuff Size: Large)   Ht 5' 7\" (1.702 m)   Wt 114 kg (251 lb 3.2 oz)   LMP 11/27/2024 (Exact Date)   Breastfeeding No   BMI 39.34 kg/m²    Physical Exam      PE:  General Appearance: alert and oriented, in no acute distress.   HEENT: PERRL, thyroid without masses or tenderness  Breast: No masses, tenderness, skin changes, nipple D/C or axillary or supraclavicular adenopathy  Abdomen: Soft, non-tender, non-distended, no masses, no rebound or guarding.  Pelvic:       External genitalia: Normal appearance, no abnormal pigmentation, no lesions or masses. Normal Bartholin's and [place]'s.      Urinary system: Urethral meatus normal, bladder non-tender.      Vaginal: normal mucosa without prolapse or lesions. Normal-appearing physiologic discharge      Cervix: Normal-appearing, well-epithelialized, no gross lesions or masses No cervical motion tenderness.      Adnexa: No adnexal masses or tenderness noted.      Uterus: Normal-sized, regular contour, midline, mobile, no uterine tenderness.  Extremities: Normal range of motion.   Skin: normal, no rash or abnormalities  Neurologic: alert, oriented x3  Psychiatric: Appropriate affect, mood stable, cooperative with exam.  "

## 2024-12-11 ENCOUNTER — HOSPITAL ENCOUNTER (OUTPATIENT)
Dept: RADIOLOGY | Facility: IMAGING CENTER | Age: 44
Discharge: HOME | End: 2024-12-11
Payer: COMMERCIAL

## 2024-12-11 DIAGNOSIS — M76.821 INSUFFICIENCY OF RIGHT POSTERIOR TIBIAL TENDON: ICD-10-CM

## 2024-12-11 DIAGNOSIS — M21.41 PES PLANUS OF RIGHT FOOT: ICD-10-CM

## 2024-12-11 DIAGNOSIS — M79.671 PAIN IN RIGHT FOOT: ICD-10-CM

## 2024-12-11 DIAGNOSIS — M72.2 PLANTAR FASCIITIS: ICD-10-CM

## 2024-12-11 PROCEDURE — 73721 MRI JNT OF LWR EXTRE W/O DYE: CPT

## 2024-12-12 LAB
CLINICAL INFO: NORMAL
CYTO CVX: NORMAL
CYTOLOGY CMNT CVX/VAG CYTO-IMP: NORMAL
DATE PREVIOUS BX: NORMAL
HPV E6+E7 MRNA CVX QL NAA+PROBE: NOT DETECTED
LMP START DATE: NORMAL
SL AMB PREV. PAP:: NORMAL
SPECIMEN SOURCE CVX/VAG CYTO: NORMAL

## 2024-12-16 ENCOUNTER — RESULTS FOLLOW-UP (OUTPATIENT)
Dept: PODIATRY | Facility: CLINIC | Age: 44
End: 2024-12-16

## 2024-12-16 ENCOUNTER — TELEPHONE (OUTPATIENT)
Age: 44
End: 2024-12-16

## 2024-12-16 NOTE — TELEPHONE ENCOUNTER
Caller: Ana Roger    Doctor: Dr. Cole    Reason for call: SX/message from Dr. Cole said to call to schedule appt to plan SX/1st I had was 1/23/25. If  Wants to see her sooner you may call her and RS her to earlier date.     Call back#: [phone]

## 2024-12-20 ENCOUNTER — TELEPHONE (OUTPATIENT)
Age: 44
End: 2024-12-20

## 2024-12-20 DIAGNOSIS — M76.821 INSUFFICIENCY OF RIGHT POSTERIOR TIBIAL TENDON: Primary | ICD-10-CM

## 2024-12-20 DIAGNOSIS — M79.671 PAIN IN RIGHT FOOT: ICD-10-CM

## 2024-12-20 RX ORDER — METHYLPREDNISOLONE 4 MG/1
TABLET ORAL
Qty: 1 EACH | Refills: 0 | Status: SHIPPED | OUTPATIENT
Start: 2024-12-20

## 2024-12-20 NOTE — TELEPHONE ENCOUNTER
Caller: Ana Roger     Doctor and/or Office: Dr. Cole/[facility]#: [phone]    Escalation: Medication/Last message that she sent to . She asked about a steroid RX to be sent to her pharmacy to help with the swelling.  did not address. Please read and call pt back to let her know RX was sent or not. Thanks

## 2024-12-23 ENCOUNTER — OFFICE VISIT (OUTPATIENT)
Dept: PODIATRY | Facility: CLINIC | Age: 44
End: 2024-12-23

## 2024-12-23 ENCOUNTER — TELEPHONE (OUTPATIENT)
Dept: OBGYN CLINIC | Facility: HOSPITAL | Age: 44
End: 2024-12-23

## 2024-12-23 VITALS — HEIGHT: 67 IN | BODY MASS INDEX: 39.24 KG/M2 | WEIGHT: 250 LBS

## 2024-12-23 DIAGNOSIS — M76.821 INSUFFICIENCY OF RIGHT POSTERIOR TIBIAL TENDON: Primary | ICD-10-CM

## 2024-12-23 DIAGNOSIS — M24.571 EQUINUS CONTRACTURE OF RIGHT ANKLE: ICD-10-CM

## 2024-12-23 DIAGNOSIS — M21.41 PES PLANUS OF RIGHT FOOT: ICD-10-CM

## 2024-12-23 DIAGNOSIS — Z01.818 PREOP TESTING: ICD-10-CM

## 2024-12-23 PROCEDURE — 99214 OFFICE O/P EST MOD 30 MIN: CPT | Performed by: PODIATRIST

## 2024-12-23 RX ORDER — MELOXICAM 15 MG/1
15 TABLET ORAL DAILY
Qty: 30 TABLET | Refills: 1 | Status: SHIPPED | OUTPATIENT
Start: 2024-12-23 | End: 2025-01-22

## 2024-12-23 NOTE — ASSESSMENT & PLAN NOTE
MRI reviewed in detail with patient  Consent was signed for the following:  Right gastroc-lengthening  TN fusion  Calc slide osteotomy  PTTD repair with FDL transfer vs artolon flexband tendon graft.    Surgical procedure and recovery discussed as can best be predicted.  Alternatives, risks, complications covered with the patient.    Appropriate postop medication discussed, educated patient on narcotic medication and its risks.     Patient will be sent for preoperative testing and clearance    Patient doen not need DVT prophylaxis for this procedure    Orders:  •  Case request operating room: REPAIR posterior tibial TENDON FOOT with possible graft vs FDL tendon transfer, ARTHRODESIS / FUSION FOOT, OSTEOTOMY CALCANEUS, RECESSION GASTROC ENDOSCOPIC; Standing  •  Comprehensive metabolic panel; Future  •  CBC and Platelet; Future  •  Durable Medical Equipment  •  Crutches  •  meloxicam (Mobic) 15 mg tablet; Take 1 tablet (15 mg total) by mouth daily

## 2024-12-23 NOTE — ASSESSMENT & PLAN NOTE
Orders:  •  Comprehensive metabolic panel; Future  •  CBC and Platelet; Future  •  Durable Medical Equipment  •  Crutches  •  meloxicam (Mobic) 15 mg tablet; Take 1 tablet (15 mg total) by mouth daily

## 2024-12-23 NOTE — ASSESSMENT & PLAN NOTE
Orders:  •  Case request operating room: REPAIR posterior tibial TENDON FOOT with possible graft vs FDL tendon transfer, ARTHRODESIS / FUSION FOOT, OSTEOTOMY CALCANEUS, RECESSION GASTROC ENDOSCOPIC; Standing  •  Comprehensive metabolic panel; Future  •  CBC and Platelet; Future  •  Durable Medical Equipment  •  Crutches  •  meloxicam (Mobic) 15 mg tablet; Take 1 tablet (15 mg total) by mouth daily

## 2024-12-23 NOTE — PROGRESS NOTES
Name: Ana Roger      : 1980      MRN: [id number]  Encounter Provider: Jarvis Cole DPM  Encounter Date: 2024   Encounter department: [facility] [place]  :  Assessment & Plan  Insufficiency of right posterior tibial tendon  MRI reviewed in detail with patient  Consent was signed for the following:  Right gastroc-lengthening  TN fusion  Calc slide osteotomy  PTTD repair with FDL transfer vs artolon flexband tendon graft.    Surgical procedure and recovery discussed as can best be predicted.  Alternatives, risks, complications covered with the patient.    Appropriate postop medication discussed, educated patient on narcotic medication and its risks.     Patient will be sent for preoperative testing and clearance    Patient doen not need DVT prophylaxis for this procedure    Orders:  •  Case request operating room: REPAIR posterior tibial TENDON FOOT with possible graft vs FDL tendon transfer, ARTHRODESIS / FUSION FOOT, OSTEOTOMY CALCANEUS, RECESSION GASTROC ENDOSCOPIC; Standing  •  Comprehensive metabolic panel; Future  •  CBC and Platelet; Future  •  Durable Medical Equipment  •  Crutches  •  meloxicam (Mobic) 15 mg tablet; Take 1 tablet (15 mg total) by mouth daily    Pes planus of right foot    Orders:  •  Case request operating room: REPAIR posterior tibial TENDON FOOT with possible graft vs FDL tendon transfer, ARTHRODESIS / FUSION FOOT, OSTEOTOMY CALCANEUS, RECESSION GASTROC ENDOSCOPIC; Standing  •  Comprehensive metabolic panel; Future  •  CBC and Platelet; Future  •  Durable Medical Equipment  •  Crutches  •  meloxicam (Mobic) 15 mg tablet; Take 1 tablet (15 mg total) by mouth daily    Equinus contracture of right ankle    Orders:  •  Case request operating room: REPAIR posterior tibial TENDON FOOT with possible graft vs FDL tendon transfer, ARTHRODESIS / FUSION FOOT, OSTEOTOMY CALCANEUS, RECESSION GASTROC ENDOSCOPIC; Standing  •  Comprehensive metabolic panel;  "Future  •  CBC and Platelet; Future  •  Durable Medical Equipment  •  Crutches  •  meloxicam (Mobic) 15 mg tablet; Take 1 tablet (15 mg total) by mouth daily    Preop testing    Orders:  •  Comprehensive metabolic panel; Future  •  CBC and Platelet; Future  •  Durable Medical Equipment  •  Crutches  •  meloxicam (Mobic) 15 mg tablet; Take 1 tablet (15 mg total) by mouth daily        History of Present Illness   HPI  Ana Roger is a 44 y.o. female who presents fot F/U and to review her MRI      Review of Systems  As stated in HPI, otherwise normal    Medical History Reviewed by provider this encounter:  Tobacco  Allergies  Meds  Problems  Med Hx  Surg Hx  Fam Hx           Objective   Ht 5' 7\" (1.702 m)   Wt 113 kg (250 lb)   LMP 11/27/2024 (Exact Date)   BMI 39.16 kg/m²      Physical Exam  Vitals reviewed.   Constitutional:       Appearance: She is obese. She is not ill-appearing.   Cardiovascular:      Rate and Rhythm: Normal rate.      Pulses: Normal pulses.   Pulmonary:      Effort: Pulmonary effort is normal. No respiratory distress.   Musculoskeletal:         General: Deformity (collpased pes planus right. Little to no PTT function with pain along tendon from lammeolus to insertion.) present.   Skin:     General: Skin is warm.      Findings: No erythema or rash.   Neurological:      Mental Status: She is alert.      Sensory: No sensory deficit.      Gait: Gait abnormal (severe calcaneal valgus with medial collapse).           "

## 2024-12-24 ENCOUNTER — RESULTS FOLLOW-UP (OUTPATIENT)
Dept: OBGYN CLINIC | Facility: CLINIC | Age: 44
End: 2024-12-24

## 2024-12-31 ENCOUNTER — OFFICE VISIT (OUTPATIENT)
Dept: FAMILY MEDICINE CLINIC | Facility: CLINIC | Age: 44
End: 2024-12-31

## 2024-12-31 VITALS
WEIGHT: 251 LBS | DIASTOLIC BLOOD PRESSURE: 88 MMHG | BODY MASS INDEX: 39.31 KG/M2 | HEART RATE: 88 BPM | OXYGEN SATURATION: 98 % | SYSTOLIC BLOOD PRESSURE: 120 MMHG

## 2024-12-31 DIAGNOSIS — J45.20 MILD INTERMITTENT ASTHMA WITHOUT COMPLICATION: Primary | ICD-10-CM

## 2024-12-31 PROCEDURE — 99213 OFFICE O/P EST LOW 20 MIN: CPT | Performed by: NURSE PRACTITIONER

## 2024-12-31 NOTE — PROGRESS NOTES
Name: Ana Roger      : 1980      MRN: [id number]  Encounter Provider: ERMELINDA Caal  Encounter Date: 2024   Encounter department: [facility]  :  Assessment & Plan  Mild intermittent asthma without complication    Condition controlled with medication. Continue Advair and albuterol as ordered.            History of Present Illness     Here for medication management for asthma. No concerns at present.       Review of Systems   Constitutional:  Negative for activity change, diaphoresis and fever.   HENT:  Negative for congestion, facial swelling and sore throat.    Eyes:  Negative for visual disturbance.   Respiratory:  Negative for cough, chest tightness, shortness of breath and wheezing.    Cardiovascular:  Negative for chest pain and palpitations.   Gastrointestinal:  Negative for abdominal pain.   Genitourinary:  Negative for difficulty urinating.   Musculoskeletal:  Negative for myalgias.   Skin:  Negative for color change and pallor.   Neurological:  Negative for dizziness, weakness and headaches.   Psychiatric/Behavioral:  Negative for dysphoric mood.        Objective   /88 (BP Location: Left arm, Patient Position: Sitting, Cuff Size: Standard)   Pulse 88   Wt 114 kg (251 lb)   SpO2 98%   BMI 39.31 kg/m²      Physical Exam  Vitals and nursing note reviewed.   Constitutional:       General: She is not in acute distress.     Appearance: Normal appearance. She is not ill-appearing or diaphoretic.   HENT:      Head: Normocephalic.      Mouth/Throat:      Mouth: Mucous membranes are moist.      Pharynx: Oropharynx is clear. No oropharyngeal exudate or posterior oropharyngeal erythema.   Eyes:      Extraocular Movements: Extraocular movements intact.   Cardiovascular:      Rate and Rhythm: Normal rate and regular rhythm.      Heart sounds: Normal heart sounds.   Pulmonary:      Effort: Pulmonary effort is normal. No respiratory distress.       Breath sounds: Normal breath sounds. No wheezing or rhonchi.   Chest:      Chest wall: No tenderness.   Musculoskeletal:         General: Normal range of motion.      Cervical back: Normal range of motion and neck supple.   Skin:     General: Skin is warm and dry.      Coloration: Skin is not pale.      Findings: No erythema.   Neurological:      Mental Status: She is alert and oriented to person, place, and time.   Psychiatric:         Mood and Affect: Mood normal.         Behavior: Behavior normal.

## 2025-01-02 ENCOUNTER — TELEPHONE (OUTPATIENT)
Age: 45
End: 2025-01-02

## 2025-01-08 ENCOUNTER — TELEPHONE (OUTPATIENT)
Dept: PODIATRY | Facility: CLINIC | Age: 45
End: 2025-01-08

## 2025-01-08 DIAGNOSIS — Z01.818 PRE-OP EVALUATION: Primary | ICD-10-CM

## 2025-01-08 NOTE — TELEPHONE ENCOUNTER
SPOKE TO JESSICA AND SHE IS EL FOR MAY. SHE WAS ALSO GIVEN [employer] CHECKERS NUMBER. SINCE SHE WILL BE SELF PAY

## 2025-01-23 DIAGNOSIS — M21.41 PES PLANUS OF RIGHT FOOT: ICD-10-CM

## 2025-01-23 DIAGNOSIS — M76.821 INSUFFICIENCY OF RIGHT POSTERIOR TIBIAL TENDON: ICD-10-CM

## 2025-01-23 DIAGNOSIS — Z01.818 PREOP TESTING: ICD-10-CM

## 2025-01-23 DIAGNOSIS — M24.571 EQUINUS CONTRACTURE OF RIGHT ANKLE: ICD-10-CM

## 2025-01-23 RX ORDER — MELOXICAM 15 MG/1
15 TABLET ORAL DAILY
Qty: 30 TABLET | Refills: 4 | Status: SHIPPED | OUTPATIENT
Start: 2025-01-23

## 2025-02-19 DIAGNOSIS — M76.821 INSUFFICIENCY OF RIGHT POSTERIOR TIBIAL TENDON: ICD-10-CM

## 2025-02-19 DIAGNOSIS — M24.571 EQUINUS CONTRACTURE OF RIGHT ANKLE: ICD-10-CM

## 2025-02-19 DIAGNOSIS — M21.41 PES PLANUS OF RIGHT FOOT: ICD-10-CM

## 2025-02-19 DIAGNOSIS — Z01.818 PREOP TESTING: ICD-10-CM

## 2025-02-20 RX ORDER — MELOXICAM 15 MG/1
15 TABLET ORAL DAILY
Qty: 30 TABLET | Refills: 0 | OUTPATIENT
Start: 2025-02-20

## 2025-02-27 ENCOUNTER — HOSPITAL ENCOUNTER (OUTPATIENT)
Dept: MAMMOGRAPHY | Facility: IMAGING CENTER | Age: 45
Discharge: HOME | End: 2025-02-27

## 2025-02-27 VITALS — WEIGHT: 250 LBS | BODY MASS INDEX: 39.24 KG/M2 | HEIGHT: 67 IN

## 2025-02-27 DIAGNOSIS — Z12.31 ENCOUNTER FOR SCREENING MAMMOGRAM FOR MALIGNANT NEOPLASM OF BREAST: ICD-10-CM

## 2025-02-27 PROCEDURE — 77067 SCR MAMMO BI INCL CAD: CPT

## 2025-02-27 PROCEDURE — 77063 BREAST TOMOSYNTHESIS BI: CPT

## 2025-04-11 LAB
ALBUMIN SERPL-MCNC: 4.3 G/DL (ref 3.6–5.1)
ALBUMIN/GLOB SERPL: 1.6 (CALC) (ref 1–2.5)
ALP SERPL-CCNC: 41 U/L (ref 31–125)
ALT SERPL-CCNC: 20 U/L (ref 6–29)
AST SERPL-CCNC: 20 U/L (ref 10–30)
BILIRUB SERPL-MCNC: 0.6 MG/DL (ref 0.2–1.2)
BUN SERPL-MCNC: 16 MG/DL (ref 7–25)
BUN/CREAT SERPL: NORMAL (CALC) (ref 6–22)
CALCIUM SERPL-MCNC: 9.4 MG/DL (ref 8.6–10.2)
CHLORIDE SERPL-SCNC: 106 MMOL/L (ref 98–110)
CO2 SERPL-SCNC: 26 MMOL/L (ref 20–32)
CREAT SERPL-MCNC: 0.85 MG/DL (ref 0.5–0.99)
ERYTHROCYTE [DISTWIDTH] IN BLOOD BY AUTOMATED COUNT: 12.5 % (ref 11–15)
GFR/BSA.PRED SERPLBLD CYS-BASED-ARV: 87 ML/MIN/1.73M2
GLOBULIN SER CALC-MCNC: 2.7 G/DL (CALC) (ref 1.9–3.7)
GLUCOSE SERPL-MCNC: 92 MG/DL (ref 65–99)
HCT VFR BLD AUTO: 44.4 % (ref 35–45)
HGB BLD-MCNC: 15 G/DL (ref 11.7–15.5)
MCH RBC QN AUTO: 30.6 PG (ref 27–33)
MCHC RBC AUTO-ENTMCNC: 33.8 G/DL (ref 32–36)
MCV RBC AUTO: 90.6 FL (ref 80–100)
PLATELET # BLD AUTO: 328 THOUSAND/UL (ref 140–400)
PMV BLD REES-ECKER: 10.4 FL (ref 7.5–12.5)
POTASSIUM SERPL-SCNC: 4.6 MMOL/L (ref 3.5–5.3)
PROT SERPL-MCNC: 7 G/DL (ref 6.1–8.1)
RBC # BLD AUTO: 4.9 MILLION/UL (ref 3.8–5.1)
SODIUM SERPL-SCNC: 140 MMOL/L (ref 135–146)
WBC # BLD AUTO: 6.9 THOUSAND/UL (ref 3.8–10.8)

## 2025-04-14 ENCOUNTER — CONSULT (OUTPATIENT)
Dept: FAMILY MEDICINE CLINIC | Facility: CLINIC | Age: 45
End: 2025-04-14

## 2025-04-14 VITALS
BODY MASS INDEX: 39.55 KG/M2 | HEIGHT: 67 IN | WEIGHT: 252 LBS | HEART RATE: 92 BPM | TEMPERATURE: 97.8 F | OXYGEN SATURATION: 98 % | DIASTOLIC BLOOD PRESSURE: 86 MMHG | SYSTOLIC BLOOD PRESSURE: 132 MMHG

## 2025-04-14 DIAGNOSIS — M24.571 EQUINUS CONTRACTURE OF RIGHT ANKLE: ICD-10-CM

## 2025-04-14 DIAGNOSIS — Z01.818 PRE-OPERATIVE CLEARANCE: Primary | ICD-10-CM

## 2025-04-14 DIAGNOSIS — M21.41 PES PLANUS OF RIGHT FOOT: ICD-10-CM

## 2025-04-14 PROCEDURE — 99214 OFFICE O/P EST MOD 30 MIN: CPT | Performed by: NURSE PRACTITIONER

## 2025-04-14 PROCEDURE — 93000 ELECTROCARDIOGRAM COMPLETE: CPT | Performed by: NURSE PRACTITIONER

## 2025-04-14 NOTE — PATIENT INSTRUCTIONS
EKG and labs reviewed.  Patient is medically cleared for surgery.      Pre-operative Medication Instructions    Avoid herbs or non-directed vitamins one week prior to surgery  Avoid aspirin containing medications or non-steroidal anti-inflammatory drugs one week preceding surgery  May take tylenol for pain up until the night before surgery           
Pulmonary fibrosis

## 2025-04-14 NOTE — ASSESSMENT & PLAN NOTE
Here for pre-op clearance for right foot tendon repair. Surgery scheduled 5/9/25. Pre-op labs and EKG are normal. Pre-op physical completed.  Patient is medically cleared for surgery.    Orders:    POCT ECG

## 2025-04-14 NOTE — PROGRESS NOTES
Pre-operative Clearance  Name: Ana Roger      : 1980      MRN: [id number]  Encounter Provider: ERMELINDA Caal  Encounter Date: 2025   Encounter department: [facility]    :  Assessment & Plan  Pre-operative clearance  Here for pre-op clearance for right foot tendon repair. Surgery scheduled 25. Pre-op labs and EKG are normal. Pre-op physical completed.  Patient is medically cleared for surgery.    Orders:    POCT ECG    Pes planus of right foot  Patient is scheduled for surgery 25: REPAIR posterior tibial TENDON FOOT with possible graft vs FDL tendon transfer, ARTHRODESIS / FUSION FOOT, OSTEOTOMY CALCANEUS, RECESSION          Equinus contracture of right ankle             Pre-operative Clearance:     Revised Cardiac Risk Index:  RCI RISK CLASS I (0 risk factors, risk of major cardiac complications approximately 0.5%)    Clearance:  Patient is medically optimized (CLEARED) for proposed surgery without any additional cardiac testing.      Medication Instructions:   - Avoid herbs or non-directed vitamins one week prior to surgery    - Avoid aspirin containing medications or non-steroidal anti-inflammatory drugs one week preceding surgery    - May take tylenol for pain up until the night before surgery        Depression Screening and Follow-up Plan: Patient was screened for depression during today's encounter. They screened negative with a PHQ-2 score of 0.        History of Present Illness     Pre-Op Examination     Surgery: REPAIR posterior tibial TENDON FOOT with possible graft vs FDL tendon transfer, ARTHRODESIS / FUSION FOOT, OSTEOTOMY CALCANEUS, RECESSION   Anticipated Date of Surgery: 25   Surgeon: Dr. Cole     Patient presents for pre-op clearance.  Hx of asthma which is controlled with inhalers. No current problems/concerns.     Previous history of bleeding disorders or clots?: No    Previous Anesthesia reaction?: No     Prolonged steroid use in the last 6 months?: No      Assessment of Cardiac Risk:   - Unstable or severe angina or MI in the last 6 weeks or history of stent placement in the last year?: No    - Decompensated heart failure (e.g. New onset heart failure, NYHA  Class IV heart failure, or worsening existing heart failure)?: No    - Significant arrhythmias such as high grade AV block, symptomatic ventricular arrhythmia, newly recognized ventricular tachycardia, supraventricular tachycardia with resting heart rate >100, or symptomatic bradycardia?: No    - Severe heart valve disease including aortic stenosis or symptomatic mitral stenosis?: No      Pre-operative Risk Factors:  - Elevated-risk surgery: No    - History of cerebrovascular disease: No    - History of ischemic heart disease: No    - History of congestive heart failure: No    - Pre-operative treatment with insulin: No    - Pre-operative creatinine >2 mg/dL: No      Review of Systems   Constitutional:  Negative for activity change, appetite change, chills, diaphoresis, fatigue and fever.   HENT:  Negative for congestion, dental problem, drooling, ear discharge, ear pain, facial swelling, hearing loss, mouth sores, nosebleeds, postnasal drip, rhinorrhea, sinus pressure, sinus pain, sneezing, sore throat, tinnitus, trouble swallowing and voice change.    Eyes:  Negative for photophobia, pain, discharge, redness, itching and visual disturbance.   Respiratory:  Negative for cough, chest tightness, shortness of breath and wheezing.    Cardiovascular:  Negative for chest pain, palpitations and leg swelling.   Gastrointestinal:  Negative for abdominal distention, abdominal pain, anal bleeding, blood in stool, constipation, diarrhea, nausea, rectal pain and vomiting.   Endocrine: Negative for cold intolerance, heat intolerance, polydipsia, polyphagia and polyuria.   Genitourinary:  Negative for decreased urine volume, difficulty urinating, dysuria, flank pain,  frequency, hematuria and urgency.   Musculoskeletal:  Negative for back pain, gait problem, joint swelling, myalgias, neck pain and neck stiffness.   Skin:  Negative for color change, pallor, rash and wound.   Neurological:  Negative for dizziness, tremors, seizures, syncope, facial asymmetry, speech difficulty, weakness, light-headedness, numbness and headaches.   Hematological:  Negative for adenopathy. Does not bruise/bleed easily.   Psychiatric/Behavioral:  Negative for agitation, behavioral problems, confusion, decreased concentration, dysphoric mood, hallucinations, self-injury, sleep disturbance and suicidal ideas. The patient is not nervous/anxious and is not hyperactive.      Past Medical History   Past Medical History:   Diagnosis Date    Asthma      2 para 2      Past Surgical History:   Procedure Laterality Date     SECTION   , 2010,     KNEE SURGERY  2011    TUBAL LIGATION       Family History   Problem Relation Age of Onset    Diabetes Mother     Hypertension Father     Heart disease Father     No Known Problems Sister     No Known Problems Sister     No Known Problems Daughter     No Known Problems Daughter     Breast cancer Maternal Grandmother         Unknown age    Heart disease Maternal Grandfather     Skin cancer Maternal Grandfather         Multiple x-unknown age    No Known Problems Paternal Grandmother     Heart attack Paternal Grandfather     No Known Problems Maternal Aunt      Social History     Tobacco Use    Smoking status: Never    Smokeless tobacco: Never   Vaping Use    Vaping status: Never Used   Substance and Sexual Activity    Alcohol use: Yes     Comment: social    Drug use: No    Sexual activity: Yes     Partners: Male     Birth control/protection: Surgical     Comment: Tubes tied     Current Outpatient Medications on File Prior to Visit   Medication Sig    albuterol (PROVENTIL HFA,VENTOLIN HFA) 90 mcg/act inhaler INHALE 2 PUFFS BY MOUTH EVERY 6  "HOURS AS NEEDED FOR WHEEZING    cetirizine (ZyrTEC) 10 mg tablet Take 10 mg by mouth daily    Fluticasone-Salmeterol (Advair) 250-50 mcg/dose inhaler Inhale 1 puff 2 (two) times a day Rinse mouth after use.    meloxicam (Mobic) 15 mg tablet Take 1 tablet (15 mg total) by mouth daily     No Known Allergies  Objective   /86   Pulse 92   Temp 97.8 °F (36.6 °C) (Tympanic)   Ht 5' 7\" (1.702 m)   Wt 114 kg (252 lb)   SpO2 98%   BMI 39.47 kg/m²     Physical Exam  Vitals and nursing note reviewed.   Constitutional:       General: She is not in acute distress.     Appearance: Normal appearance. She is not ill-appearing, toxic-appearing or diaphoretic.   HENT:      Head: Normocephalic.      Right Ear: Tympanic membrane, ear canal and external ear normal. There is no impacted cerumen.      Left Ear: Tympanic membrane, ear canal and external ear normal. There is no impacted cerumen.      Nose: Nose normal. No congestion or rhinorrhea.      Mouth/Throat:      Mouth: Mucous membranes are moist.      Pharynx: Oropharynx is clear. No oropharyngeal exudate or posterior oropharyngeal erythema.   Eyes:      General: No scleral icterus.        Right eye: No discharge.         Left eye: No discharge.      Extraocular Movements: Extraocular movements intact.      Conjunctiva/sclera: Conjunctivae normal.      Pupils: Pupils are equal, round, and reactive to light.   Neck:      Vascular: No carotid bruit.   Cardiovascular:      Rate and Rhythm: Normal rate and regular rhythm.      Pulses: Normal pulses.      Heart sounds: Normal heart sounds. No murmur heard.     No friction rub. No gallop.   Pulmonary:      Effort: Pulmonary effort is normal. No respiratory distress.      Breath sounds: Normal breath sounds. No stridor. No wheezing, rhonchi or rales.   Chest:      Chest wall: No tenderness.   Abdominal:      General: Abdomen is flat. Bowel sounds are normal. There is no distension.      Palpations: Abdomen is soft. There is no " mass.      Tenderness: There is no abdominal tenderness. There is no right CVA tenderness, left CVA tenderness, guarding or rebound.      Hernia: No hernia is present.   Musculoskeletal:         General: No swelling, tenderness, deformity or signs of injury. Normal range of motion.      Cervical back: Normal range of motion and neck supple. No rigidity or tenderness. No muscular tenderness.      Right lower leg: No edema.      Left lower leg: No edema.   Lymphadenopathy:      Cervical: No cervical adenopathy.   Skin:     General: Skin is warm.      Capillary Refill: Capillary refill takes less than 2 seconds.      Coloration: Skin is not jaundiced or pale.      Findings: No bruising, erythema, lesion or rash.   Neurological:      General: No focal deficit present.      Mental Status: She is alert and oriented to person, place, and time.      Cranial Nerves: No cranial nerve deficit.      Sensory: No sensory deficit.      Motor: No weakness.      Coordination: Coordination normal.      Gait: Gait normal.      Deep Tendon Reflexes: Reflexes normal.   Psychiatric:         Mood and Affect: Mood normal.         Behavior: Behavior normal.         Thought Content: Thought content normal.         Judgment: Judgment normal.           ERMELINDA Caal

## 2025-04-14 NOTE — H&P (VIEW-ONLY)
Pre-operative Clearance  Name: Ana Roger      : 1980      MRN: [id number]  Encounter Provider: ERMELINDA Caal  Encounter Date: 2025   Encounter department: [facility]    :  Assessment & Plan  Pre-operative clearance  Here for pre-op clearance for right foot tendon repair. Surgery scheduled 25. Pre-op labs and EKG are normal. Pre-op physical completed.  Patient is medically cleared for surgery.    Orders:    POCT ECG    Pes planus of right foot  Patient is scheduled for surgery 25: REPAIR posterior tibial TENDON FOOT with possible graft vs FDL tendon transfer, ARTHRODESIS / FUSION FOOT, OSTEOTOMY CALCANEUS, RECESSION          Equinus contracture of right ankle             Pre-operative Clearance:     Revised Cardiac Risk Index:  RCI RISK CLASS I (0 risk factors, risk of major cardiac complications approximately 0.5%)    Clearance:  Patient is medically optimized (CLEARED) for proposed surgery without any additional cardiac testing.      Medication Instructions:   - Avoid herbs or non-directed vitamins one week prior to surgery    - Avoid aspirin containing medications or non-steroidal anti-inflammatory drugs one week preceding surgery    - May take tylenol for pain up until the night before surgery        Depression Screening and Follow-up Plan: Patient was screened for depression during today's encounter. They screened negative with a PHQ-2 score of 0.        History of Present Illness     Pre-Op Examination     Surgery: REPAIR posterior tibial TENDON FOOT with possible graft vs FDL tendon transfer, ARTHRODESIS / FUSION FOOT, OSTEOTOMY CALCANEUS, RECESSION   Anticipated Date of Surgery: 25   Surgeon: Dr. Cole     Patient presents for pre-op clearance.  Hx of asthma which is controlled with inhalers. No current problems/concerns.     Previous history of bleeding disorders or clots?: No    Previous Anesthesia reaction?: No     Prolonged steroid use in the last 6 months?: No      Assessment of Cardiac Risk:   - Unstable or severe angina or MI in the last 6 weeks or history of stent placement in the last year?: No    - Decompensated heart failure (e.g. New onset heart failure, NYHA  Class IV heart failure, or worsening existing heart failure)?: No    - Significant arrhythmias such as high grade AV block, symptomatic ventricular arrhythmia, newly recognized ventricular tachycardia, supraventricular tachycardia with resting heart rate >100, or symptomatic bradycardia?: No    - Severe heart valve disease including aortic stenosis or symptomatic mitral stenosis?: No      Pre-operative Risk Factors:  - Elevated-risk surgery: No    - History of cerebrovascular disease: No    - History of ischemic heart disease: No    - History of congestive heart failure: No    - Pre-operative treatment with insulin: No    - Pre-operative creatinine >2 mg/dL: No      Review of Systems   Constitutional:  Negative for activity change, appetite change, chills, diaphoresis, fatigue and fever.   HENT:  Negative for congestion, dental problem, drooling, ear discharge, ear pain, facial swelling, hearing loss, mouth sores, nosebleeds, postnasal drip, rhinorrhea, sinus pressure, sinus pain, sneezing, sore throat, tinnitus, trouble swallowing and voice change.    Eyes:  Negative for photophobia, pain, discharge, redness, itching and visual disturbance.   Respiratory:  Negative for cough, chest tightness, shortness of breath and wheezing.    Cardiovascular:  Negative for chest pain, palpitations and leg swelling.   Gastrointestinal:  Negative for abdominal distention, abdominal pain, anal bleeding, blood in stool, constipation, diarrhea, nausea, rectal pain and vomiting.   Endocrine: Negative for cold intolerance, heat intolerance, polydipsia, polyphagia and polyuria.   Genitourinary:  Negative for decreased urine volume, difficulty urinating, dysuria, flank pain,  frequency, hematuria and urgency.   Musculoskeletal:  Negative for back pain, gait problem, joint swelling, myalgias, neck pain and neck stiffness.   Skin:  Negative for color change, pallor, rash and wound.   Neurological:  Negative for dizziness, tremors, seizures, syncope, facial asymmetry, speech difficulty, weakness, light-headedness, numbness and headaches.   Hematological:  Negative for adenopathy. Does not bruise/bleed easily.   Psychiatric/Behavioral:  Negative for agitation, behavioral problems, confusion, decreased concentration, dysphoric mood, hallucinations, self-injury, sleep disturbance and suicidal ideas. The patient is not nervous/anxious and is not hyperactive.      Past Medical History   Past Medical History:   Diagnosis Date    Asthma      2 para 2      Past Surgical History:   Procedure Laterality Date     SECTION   , 2010,     KNEE SURGERY  2011    TUBAL LIGATION       Family History   Problem Relation Age of Onset    Diabetes Mother     Hypertension Father     Heart disease Father     No Known Problems Sister     No Known Problems Sister     No Known Problems Daughter     No Known Problems Daughter     Breast cancer Maternal Grandmother         Unknown age    Heart disease Maternal Grandfather     Skin cancer Maternal Grandfather         Multiple x-unknown age    No Known Problems Paternal Grandmother     Heart attack Paternal Grandfather     No Known Problems Maternal Aunt      Social History     Tobacco Use    Smoking status: Never    Smokeless tobacco: Never   Vaping Use    Vaping status: Never Used   Substance and Sexual Activity    Alcohol use: Yes     Comment: social    Drug use: No    Sexual activity: Yes     Partners: Male     Birth control/protection: Surgical     Comment: Tubes tied     Current Outpatient Medications on File Prior to Visit   Medication Sig    albuterol (PROVENTIL HFA,VENTOLIN HFA) 90 mcg/act inhaler INHALE 2 PUFFS BY MOUTH EVERY 6  "HOURS AS NEEDED FOR WHEEZING    cetirizine (ZyrTEC) 10 mg tablet Take 10 mg by mouth daily    Fluticasone-Salmeterol (Advair) 250-50 mcg/dose inhaler Inhale 1 puff 2 (two) times a day Rinse mouth after use.    meloxicam (Mobic) 15 mg tablet Take 1 tablet (15 mg total) by mouth daily     No Known Allergies  Objective   /86   Pulse 92   Temp 97.8 °F (36.6 °C) (Tympanic)   Ht 5' 7\" (1.702 m)   Wt 114 kg (252 lb)   SpO2 98%   BMI 39.47 kg/m²     Physical Exam  Vitals and nursing note reviewed.   Constitutional:       General: She is not in acute distress.     Appearance: Normal appearance. She is not ill-appearing, toxic-appearing or diaphoretic.   HENT:      Head: Normocephalic.      Right Ear: Tympanic membrane, ear canal and external ear normal. There is no impacted cerumen.      Left Ear: Tympanic membrane, ear canal and external ear normal. There is no impacted cerumen.      Nose: Nose normal. No congestion or rhinorrhea.      Mouth/Throat:      Mouth: Mucous membranes are moist.      Pharynx: Oropharynx is clear. No oropharyngeal exudate or posterior oropharyngeal erythema.   Eyes:      General: No scleral icterus.        Right eye: No discharge.         Left eye: No discharge.      Extraocular Movements: Extraocular movements intact.      Conjunctiva/sclera: Conjunctivae normal.      Pupils: Pupils are equal, round, and reactive to light.   Neck:      Vascular: No carotid bruit.   Cardiovascular:      Rate and Rhythm: Normal rate and regular rhythm.      Pulses: Normal pulses.      Heart sounds: Normal heart sounds. No murmur heard.     No friction rub. No gallop.   Pulmonary:      Effort: Pulmonary effort is normal. No respiratory distress.      Breath sounds: Normal breath sounds. No stridor. No wheezing, rhonchi or rales.   Chest:      Chest wall: No tenderness.   Abdominal:      General: Abdomen is flat. Bowel sounds are normal. There is no distension.      Palpations: Abdomen is soft. There is no " mass.      Tenderness: There is no abdominal tenderness. There is no right CVA tenderness, left CVA tenderness, guarding or rebound.      Hernia: No hernia is present.   Musculoskeletal:         General: No swelling, tenderness, deformity or signs of injury. Normal range of motion.      Cervical back: Normal range of motion and neck supple. No rigidity or tenderness. No muscular tenderness.      Right lower leg: No edema.      Left lower leg: No edema.   Lymphadenopathy:      Cervical: No cervical adenopathy.   Skin:     General: Skin is warm.      Capillary Refill: Capillary refill takes less than 2 seconds.      Coloration: Skin is not jaundiced or pale.      Findings: No bruising, erythema, lesion or rash.   Neurological:      General: No focal deficit present.      Mental Status: She is alert and oriented to person, place, and time.      Cranial Nerves: No cranial nerve deficit.      Sensory: No sensory deficit.      Motor: No weakness.      Coordination: Coordination normal.      Gait: Gait normal.      Deep Tendon Reflexes: Reflexes normal.   Psychiatric:         Mood and Affect: Mood normal.         Behavior: Behavior normal.         Thought Content: Thought content normal.         Judgment: Judgment normal.           ERMELINDA Caal

## 2025-04-14 NOTE — ASSESSMENT & PLAN NOTE
Patient is scheduled for surgery 5/9/25: REPAIR posterior tibial TENDON FOOT with possible graft vs FDL tendon transfer, ARTHRODESIS / FUSION FOOT, OSTEOTOMY CALCANEUS, RECESSION

## 2025-04-30 RX ORDER — ACETAMINOPHEN 500 MG
500 TABLET ORAL EVERY 6 HOURS PRN
COMMUNITY

## 2025-04-30 RX ORDER — MULTIVITAMIN
1 TABLET ORAL DAILY
COMMUNITY

## 2025-04-30 NOTE — PRE-PROCEDURE INSTRUCTIONS
Pre-Surgery Instructions:   Medication Instructions    acetaminophen (TYLENOL) 500 mg tablet Uses PRN- OK to take day of surgery    albuterol (PROVENTIL HFA,VENTOLIN HFA) 90 mcg/act inhaler Uses PRN- OK to take day of surgery    cetirizine (ZyrTEC) 10 mg tablet Take day of surgery.    Fluticasone-Salmeterol (Advair) 250-50 mcg/dose inhaler Take day of surgery.    meloxicam (Mobic) 15 mg tablet Stop taking 3 days prior to surgery.    Multiple Vitamin (multivitamin) tablet Stop taking 7 days prior to surgery.   Medication instructions for day of surgery reviewed. Please take all instructed medications with only a sip of water.       You will receive a call one business day prior to surgery with an arrival time and hospital directions. If your surgery is scheduled on a Monday, the hospital will be calling you on the Friday prior to your surgery. If you have not heard from anyone by 8pm, please call the hospital supervisor through the hospital  at [phone]. ([place] [phone] or [place] [phone]).    Do not eat or drink anything after midnight the night before your surgery, including candy, mints, lifesavers, or chewing gum. Do not drink alcohol 24hrs before your surgery. Try not to smoke at least 24hrs before your surgery.       Follow the pre surgery showering instructions as listed in the “My Surgical Experience Booklet” or otherwise provided by your surgeon's office. Do not use a blade to shave the surgical area 1 week before surgery. It is okay to use a clean electric clippers up to 24 hours before surgery. Do not apply any lotions, creams, including makeup, cologne, deodorant, or perfumes after showering on the day of your surgery. Do not use dry shampoo, hair spray, hair gel, or any type of hair products.     No contact lenses, eye make-up, or artificial eyelashes. Remove nail polish, including gel polish, and any artificial, gel, or acrylic nails if possible. Remove all jewelry including  rings and body piercing jewelry.     Wear causal clothing that is easy to take on and off. Consider your type of surgery.    Keep any valuables, jewelry, piercings at home. Please bring any specially ordered equipment (sling, braces) if indicated.    Arrange for a responsible person to drive you to and from the hospital on the day of your surgery. Please confirm the visitor policy for the day of your procedure when you receive your phone call with an arrival time.     Call the surgeon's office with any new illnesses, exposures, or additional questions prior to surgery.    Please reference your “My Surgical Experience Booklet” for additional information to prepare for your upcoming surgery.

## 2025-05-08 ENCOUNTER — ANESTHESIA EVENT (OUTPATIENT)
Dept: PERIOP | Facility: HOSPITAL | Age: 45
End: 2025-05-08

## 2025-05-09 ENCOUNTER — ANESTHESIA (OUTPATIENT)
Dept: PERIOP | Facility: HOSPITAL | Age: 45
End: 2025-05-09

## 2025-05-09 ENCOUNTER — APPOINTMENT (OUTPATIENT)
Dept: RADIOLOGY | Facility: HOSPITAL | Age: 45
End: 2025-05-09

## 2025-05-09 ENCOUNTER — HOSPITAL ENCOUNTER (OUTPATIENT)
Facility: HOSPITAL | Age: 45
Setting detail: OUTPATIENT SURGERY
Discharge: HOME | End: 2025-05-09
Attending: PODIATRIST | Admitting: PODIATRIST

## 2025-05-09 VITALS
OXYGEN SATURATION: 94 % | RESPIRATION RATE: 16 BRPM | SYSTOLIC BLOOD PRESSURE: 140 MMHG | DIASTOLIC BLOOD PRESSURE: 89 MMHG | HEART RATE: 86 BPM | TEMPERATURE: 98.2 F

## 2025-05-09 DIAGNOSIS — G89.18 POST-OP PAIN: Primary | ICD-10-CM

## 2025-05-09 LAB
EXT PREGNANCY TEST URINE: NEGATIVE
EXT. CONTROL: NORMAL

## 2025-05-09 PROCEDURE — C1734 ORTH/DEVIC/DRUG BN/BN,TIS/BN: HCPCS | Performed by: PODIATRIST

## 2025-05-09 PROCEDURE — 27687 REVISION OF CALF TENDON: CPT | Performed by: PODIATRIST

## 2025-05-09 PROCEDURE — C1713 ANCHOR/SCREW BN/BN,TIS/BN: HCPCS | Performed by: PODIATRIST

## 2025-05-09 PROCEDURE — 28740 FUSION OF FOOT BONES: CPT | Performed by: PODIATRIST

## 2025-05-09 PROCEDURE — 28300 INCISION OF HEEL BONE: CPT | Performed by: PODIATRIST

## 2025-05-09 PROCEDURE — C1781 MESH (IMPLANTABLE): HCPCS | Performed by: PODIATRIST

## 2025-05-09 PROCEDURE — 28200 REPAIR OF FOOT TENDON: CPT | Performed by: PODIATRIST

## 2025-05-09 PROCEDURE — 81025 URINE PREGNANCY TEST: CPT | Performed by: PODIATRIST

## 2025-05-09 PROCEDURE — C1769 GUIDE WIRE: HCPCS | Performed by: PODIATRIST

## 2025-05-09 PROCEDURE — 73630 X-RAY EXAM OF FOOT: CPT

## 2025-05-09 PROCEDURE — 99024 POSTOP FOLLOW-UP VISIT: CPT | Performed by: PODIATRIST

## 2025-05-09 DEVICE — INJECTABLE KIT
Type: IMPLANTABLE DEVICE | Site: HEEL | Status: FUNCTIONAL
Brand: AUGMENT® INJECTABLE

## 2025-05-09 DEVICE — HEADLESS COMPRESSION SCREW
Type: IMPLANTABLE DEVICE | Site: HEEL | Status: FUNCTIONAL
Brand: FIXOS

## 2025-05-09 DEVICE — HEADLESS COMPRESSION SCREW
Type: IMPLANTABLE DEVICE | Site: ANKLE | Status: FUNCTIONAL
Brand: FIXOS

## 2025-05-09 DEVICE — IMPLANTABLE DEVICE
Type: IMPLANTABLE DEVICE | Site: FOOT | Status: FUNCTIONAL
Brand: EASYFUSE™

## 2025-05-09 DEVICE — DYNAMIC MATRIX
Type: IMPLANTABLE DEVICE | Site: FOOT | Status: FUNCTIONAL
Brand: FLEXBAND

## 2025-05-09 RX ORDER — ACETAMINOPHEN 10 MG/ML
INJECTION, SOLUTION INTRAVENOUS AS NEEDED
Status: DISCONTINUED | OUTPATIENT
Start: 2025-05-09 | End: 2025-05-09

## 2025-05-09 RX ORDER — CEFAZOLIN SODIUM 2 G/50ML
2000 SOLUTION INTRAVENOUS ONCE
Status: COMPLETED | OUTPATIENT
Start: 2025-05-09 | End: 2025-05-09

## 2025-05-09 RX ORDER — ASPIRIN 325 MG
325 TABLET ORAL DAILY
Qty: 30 TABLET | Refills: 0 | Status: SHIPPED | OUTPATIENT
Start: 2025-05-09 | End: 2025-06-08

## 2025-05-09 RX ORDER — METOCLOPRAMIDE HYDROCHLORIDE 5 MG/ML
10 INJECTION INTRAMUSCULAR; INTRAVENOUS ONCE AS NEEDED
Status: COMPLETED | OUTPATIENT
Start: 2025-05-09 | End: 2025-05-09

## 2025-05-09 RX ORDER — FENTANYL CITRATE 50 UG/ML
INJECTION, SOLUTION INTRAMUSCULAR; INTRAVENOUS AS NEEDED
Status: DISCONTINUED | OUTPATIENT
Start: 2025-05-09 | End: 2025-05-09

## 2025-05-09 RX ORDER — OXYCODONE AND ACETAMINOPHEN 5; 325 MG/1; MG/1
1 TABLET ORAL EVERY 4 HOURS PRN
Qty: 30 TABLET | Refills: 0 | Status: SHIPPED | OUTPATIENT
Start: 2025-05-09 | End: 2025-06-08

## 2025-05-09 RX ORDER — ONDANSETRON 2 MG/ML
4 INJECTION INTRAMUSCULAR; INTRAVENOUS ONCE AS NEEDED
Status: COMPLETED | OUTPATIENT
Start: 2025-05-09 | End: 2025-05-09

## 2025-05-09 RX ORDER — ONDANSETRON 2 MG/ML
INJECTION INTRAMUSCULAR; INTRAVENOUS AS NEEDED
Status: DISCONTINUED | OUTPATIENT
Start: 2025-05-09 | End: 2025-05-09

## 2025-05-09 RX ORDER — OXYCODONE AND ACETAMINOPHEN 5; 325 MG/1; MG/1
1 TABLET ORAL EVERY 4 HOURS PRN
Refills: 0 | Status: DISCONTINUED | OUTPATIENT
Start: 2025-05-09 | End: 2025-05-09 | Stop reason: HOSPADM

## 2025-05-09 RX ORDER — KETOROLAC TROMETHAMINE 30 MG/ML
INJECTION, SOLUTION INTRAMUSCULAR; INTRAVENOUS AS NEEDED
Status: DISCONTINUED | OUTPATIENT
Start: 2025-05-09 | End: 2025-05-09

## 2025-05-09 RX ORDER — PROPOFOL 10 MG/ML
INJECTION, EMULSION INTRAVENOUS AS NEEDED
Status: DISCONTINUED | OUTPATIENT
Start: 2025-05-09 | End: 2025-05-09

## 2025-05-09 RX ORDER — HYDROMORPHONE HCL/PF 1 MG/ML
0.5 SYRINGE (ML) INJECTION
Status: DISCONTINUED | OUTPATIENT
Start: 2025-05-09 | End: 2025-05-09 | Stop reason: HOSPADM

## 2025-05-09 RX ORDER — MAGNESIUM HYDROXIDE 1200 MG/15ML
LIQUID ORAL AS NEEDED
Status: DISCONTINUED | OUTPATIENT
Start: 2025-05-09 | End: 2025-05-09 | Stop reason: HOSPADM

## 2025-05-09 RX ORDER — BUPIVACAINE HYDROCHLORIDE 5 MG/ML
INJECTION, SOLUTION EPIDURAL; INTRACAUDAL; PERINEURAL AS NEEDED
Status: DISCONTINUED | OUTPATIENT
Start: 2025-05-09 | End: 2025-05-09

## 2025-05-09 RX ORDER — MIDAZOLAM HYDROCHLORIDE 2 MG/2ML
INJECTION, SOLUTION INTRAMUSCULAR; INTRAVENOUS AS NEEDED
Status: DISCONTINUED | OUTPATIENT
Start: 2025-05-09 | End: 2025-05-09

## 2025-05-09 RX ORDER — FENTANYL CITRATE/PF 50 MCG/ML
50 SYRINGE (ML) INJECTION
Status: DISCONTINUED | OUTPATIENT
Start: 2025-05-09 | End: 2025-05-09 | Stop reason: HOSPADM

## 2025-05-09 RX ORDER — SODIUM CHLORIDE, SODIUM LACTATE, POTASSIUM CHLORIDE, CALCIUM CHLORIDE 600; 310; 30; 20 MG/100ML; MG/100ML; MG/100ML; MG/100ML
INJECTION, SOLUTION INTRAVENOUS CONTINUOUS PRN
Status: DISCONTINUED | OUTPATIENT
Start: 2025-05-09 | End: 2025-05-09

## 2025-05-09 RX ORDER — HYDROMORPHONE HCL/PF 1 MG/ML
SYRINGE (ML) INJECTION AS NEEDED
Status: DISCONTINUED | OUTPATIENT
Start: 2025-05-09 | End: 2025-05-09

## 2025-05-09 RX ORDER — DEXAMETHASONE SODIUM PHOSPHATE 10 MG/ML
INJECTION, SOLUTION INTRAMUSCULAR; INTRAVENOUS AS NEEDED
Status: DISCONTINUED | OUTPATIENT
Start: 2025-05-09 | End: 2025-05-09

## 2025-05-09 RX ADMIN — METOCLOPRAMIDE 10 MG: 5 INJECTION, SOLUTION INTRAMUSCULAR; INTRAVENOUS at 12:22

## 2025-05-09 RX ADMIN — FENTANYL CITRATE 50 MCG: 50 INJECTION, SOLUTION INTRAMUSCULAR; INTRAVENOUS at 07:32

## 2025-05-09 RX ADMIN — PROPOFOL 200 MG: 10 INJECTION, EMULSION INTRAVENOUS at 07:49

## 2025-05-09 RX ADMIN — FENTANYL CITRATE 50 MCG: 50 INJECTION, SOLUTION INTRAMUSCULAR; INTRAVENOUS at 09:40

## 2025-05-09 RX ADMIN — BUPIVACAINE HYDROCHLORIDE 15 ML: 5 INJECTION, SOLUTION EPIDURAL; INTRACAUDAL; PERINEURAL at 07:30

## 2025-05-09 RX ADMIN — CEFAZOLIN SODIUM 2000 MG: 2 SOLUTION INTRAVENOUS at 07:45

## 2025-05-09 RX ADMIN — BUPIVACAINE 10 ML: 13.3 INJECTION, SUSPENSION, LIPOSOMAL INFILTRATION at 07:30

## 2025-05-09 RX ADMIN — MIDAZOLAM 2 MG: 1 INJECTION INTRAMUSCULAR; INTRAVENOUS at 07:28

## 2025-05-09 RX ADMIN — ONDANSETRON 4 MG: 2 INJECTION INTRAMUSCULAR; INTRAVENOUS at 12:48

## 2025-05-09 RX ADMIN — KETOROLAC TROMETHAMINE 30 MG: 30 INJECTION, SOLUTION INTRAMUSCULAR; INTRAVENOUS at 10:12

## 2025-05-09 RX ADMIN — HYDROMORPHONE HYDROCHLORIDE 0.5 MG: 1 INJECTION, SOLUTION INTRAMUSCULAR; INTRAVENOUS; SUBCUTANEOUS at 08:00

## 2025-05-09 RX ADMIN — SODIUM CHLORIDE, SODIUM LACTATE, POTASSIUM CHLORIDE, AND CALCIUM CHLORIDE: .6; .31; .03; .02 INJECTION, SOLUTION INTRAVENOUS at 07:10

## 2025-05-09 RX ADMIN — FENTANYL CITRATE 50 MCG: 50 INJECTION, SOLUTION INTRAMUSCULAR; INTRAVENOUS at 09:39

## 2025-05-09 RX ADMIN — BUPIVACAINE 10 ML: 13.3 INJECTION, SUSPENSION, LIPOSOMAL INFILTRATION at 07:32

## 2025-05-09 RX ADMIN — DEXAMETHASONE SODIUM PHOSPHATE 10 MG: 10 INJECTION, SOLUTION INTRAMUSCULAR; INTRAVENOUS at 07:52

## 2025-05-09 RX ADMIN — BUPIVACAINE HYDROCHLORIDE 15 ML: 5 INJECTION, SOLUTION EPIDURAL; INTRACAUDAL; PERINEURAL at 07:32

## 2025-05-09 RX ADMIN — FENTANYL CITRATE 50 MCG: 50 INJECTION, SOLUTION INTRAMUSCULAR; INTRAVENOUS at 07:28

## 2025-05-09 RX ADMIN — ACETAMINOPHEN 1000 MG: 10 INJECTION INTRAVENOUS at 10:01

## 2025-05-09 RX ADMIN — FENTANYL CITRATE 50 MCG: 50 INJECTION INTRAMUSCULAR; INTRAVENOUS at 11:10

## 2025-05-09 RX ADMIN — ONDANSETRON 4 MG: 2 INJECTION INTRAMUSCULAR; INTRAVENOUS at 10:12

## 2025-05-09 NOTE — DISCHARGE INSTR - AVS FIRST PAGE
Dr. Cole  Post-Operative Instructions    1. Take your prescribed medication as directed.   2. Upon arrival at home, lie down and elevate your surgical foot on 2 pillows.  3. Stay off your feet as much as possible for the first 24-48 hours. This is when your feet first swell and may become painful. After 48 hours you may begin limited walking following these restrictions:   Nonweightbearing to surgical foot  4. Drink large quantities of water. Consume no alcohol. Continue a well-balanced diet.  5. Report any unusual discomfort or fever to this office.  6. A limited amount of discomfort and swelling is to be expected. In some cases the skin may take on a bruised appearance. The surgical cleansing solution that was applied to your foot prior to the operation is dark in color and the operation site may appear to be oozing when it actually is not.  7. A slight amount of blood is to be expected, and is no cause for alarm. Do not remove the dressings. If there is active bleeding and if the bleeding persists, add additional gauze to the bandage, apply direct pressure, elevate your feet and call this office.  8. Do not get the dressings wet. As regular bathing may be inconvenient, sponge baths are recommended.   9. When anesthesia wears off and if any discomfort should be present, apply an ice pack directly over the operated area for 15 minute intervals for several hours or until the pain leaves. (USE IN EXCESS OF 15 MINUTES COULD CAUSE FROSTBITE). Do not use hot water bags or electric pads. A convenient icepack can be made by placing ice cubes in a plastic bag and covering this with a towel.  10. Take over-the-counter laxative for constipation, this is common with use of narcotic medications.

## 2025-05-09 NOTE — ANESTHESIA POSTPROCEDURE EVALUATION
Post-Op Assessment Note    CV Status:  Stable    Pain management: adequate       Mental Status:  Alert and awake   Hydration Status:  Euvolemic   PONV Controlled:  Controlled   Airway Patency:  Patent     Post Op Vitals Reviewed: Yes    No anethesia notable event occurred.    Staff: Anesthesiologist           Last Filed PACU Vitals:  Vitals Value Taken Time   Temp     Pulse 97 05/09/25 1124   /72 05/09/25 1116   Resp 10 05/09/25 1124   SpO2 91 % 05/09/25 1124   Vitals shown include unfiled device data.

## 2025-05-09 NOTE — ANESTHESIA PROCEDURE NOTES
Peripheral Block    Patient location during procedure: holding area  Start time: 5/9/2025 7:30 AM  Reason for block: at surgeon's request and post-op pain management  Staffing  Performed by: Freida Moss MD  Authorized by: Freida Moss MD    Preanesthetic Checklist  Completed: patient identified, IV checked, site marked, risks and benefits discussed, surgical consent, monitors and equipment checked, pre-op evaluation and timeout performed  Peripheral Block  Patient position: supine  Prep: ChloraPrep  Patient monitoring: frequent blood pressure checks, continuous pulse oximetry and heart rate  Block type: Popliteal  Laterality: right  Injection technique: single-shot  Procedures: ultrasound guided, Ultrasound guidance required for the procedure to increase accuracy and safety of medication placement and decrease risk of complications.  Ultrasound permanent image saved    Needle  Needle type: Stimuplex   Needle gauge: 20 G  Needle length: 4 in  Needle localization: anatomical landmarks and ultrasound guidance  Needle insertion depth: 2 cm  Assessment  Injection assessment: incremental injection, frequent aspiration, injected with ease, negative aspiration, negative for heart rate change, no paresthesia on injection, no symptoms of intraneural/intravenous injection and needle tip visualized at all times  Paresthesia pain: none  Post-procedure:  site cleaned  patient tolerated the procedure well with no immediate complications

## 2025-05-09 NOTE — ANESTHESIA PROCEDURE NOTES
Peripheral Block    Patient location during procedure: holding area  Start time: 5/9/2025 7:32 AM  Reason for block: at surgeon's request and post-op pain management  Staffing  Performed by: Freida Moss MD  Authorized by: Freida Moss MD    Preanesthetic Checklist  Completed: patient identified, IV checked, site marked, risks and benefits discussed, surgical consent, monitors and equipment checked, pre-op evaluation and timeout performed  Peripheral Block  Patient position: supine  Prep: ChloraPrep  Patient monitoring: frequent blood pressure checks, continuous pulse oximetry and heart rate  Block type: Adductor Canal  Laterality: right  Injection technique: single-shot  Procedures: ultrasound guided, Ultrasound guidance required for the procedure to increase accuracy and safety of medication placement and decrease risk of complications.  Ultrasound permanent image saved    Needle  Needle type: Stimuplex   Needle gauge: 20 G  Needle length: 4 in  Needle localization: anatomical landmarks and ultrasound guidance  Needle insertion depth: 3 cm  Assessment  Injection assessment: incremental injection, frequent aspiration, injected with ease, negative aspiration, negative for heart rate change, no paresthesia on injection, no symptoms of intraneural/intravenous injection and needle tip visualized at all times  Paresthesia pain: none  Post-procedure:  site cleaned  patient tolerated the procedure well with no immediate complications

## 2025-05-09 NOTE — NURSING NOTE
Pt returned to APU awake,alert, IV infusing, 4/10 operative pain, not requiring pain medication at this time, suddenly felt nauseated, cold compress applied to forehead, resting,  at bedside.

## 2025-05-09 NOTE — ANESTHESIA PREPROCEDURE EVALUATION
Procedure:  REPAIR posterior tibial TENDON FOOT with possible graft vs FDL tendon transfer (Right: Ankle)  ARTHRODESIS / FUSION FOOT (Right: Foot)  OSTEOTOMY CALCANEUS (Right: Foot)  RECESSION GASTROC ENDOSCOPIC (Right: Foot)    Relevant Problems   MUSCULOSKELETAL   (+) Asthma, exercise induced      PULMONARY   (+) Asthma       Latest Reference Range & Units 05/09/25 07:06   PREGNANCY TEST URINE Negative  Negative     Physical Exam    Airway    Mallampati score: II  TM Distance: >3 FB  Neck ROM: full     Dental   No notable dental hx     Cardiovascular      Pulmonary      Other Findings  post-pubertal.      Anesthesia Plan  ASA Score- 3     Anesthesia Type- general with ASA Monitors.         Additional Monitors:     Airway Plan: LMA.           Plan Factors-Exercise tolerance (METS): >4 METS.    Chart reviewed.   Existing labs reviewed. Patient summary reviewed.    Patient is not a current smoker.  Patient did not smoke on day of surgery.            Induction- intravenous.    Postoperative Plan- Plan for postoperative opioid use.     Perioperative Resuscitation Plan - Level 1 - Full Code.       Informed Consent- Anesthetic plan and risks discussed with patient.  I personally reviewed this patient with the CRNA. Discussed and agreed on the Anesthesia Plan with the CRNA..      NPO Status:  Vitals Value Taken Time   Date of last liquid 05/08/25 05/09/25 0613   Time of last liquid 1700 05/09/25 0613   Date of last solid 05/08/25 05/09/25 0613   Time of last solid 1700 05/09/25 0613

## 2025-05-09 NOTE — OP NOTE
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 Item Serial No.  Lot No. LRB No. Used Action   SCREW 7 X 50MM HDLS FIXOS - [id number]  SCREW 7 X 50MM HDLS FIXOS  [employer]  Right 1 Implanted   SCREW 7 X 55MM HDLS FIXOS - [id number]  SCREW 7 X 55MM HDLS FIXOS  [employer]  Right 1 Implanted   GRAFT AUGMENT BONE 3ML INJ - [id number]  GRAFT AUGMENT BONE 3ML INJ  [facility] [id number] Right 1 Implanted   REINFORCEMENT FLEXBAND DYNAMIC MATRIX 0.3 CM X 16 CM - [id number]  REINFORCEMENT FLEXBAND DYNAMIC MATRIX 0.3 CM X 16 CM [id number] [employer]AVE [id number] Right 1 Implanted   STAPLE EASYFUSE 20 X 20MM NITINOL 2-LEG - [id number]  STAPLE EASYFUSE 20 X 20MM NITINOL 2-LEG  [facility] [id number] Right 1 Implanted   STAPLE EASYFUSE 20 X 20MM NITINOL 2-LEG - [id number]  STAPLE EASYFUSE 20 X 20MM NITINOL 2-LEG  [facility] [id number] Right 1 Implanted   SCREW 5 X 50MM  HDLS FIXOS - [id number]  SCREW 5 X 50MM HDLS FIXOS  [employer]  Right 1 Implanted       Operative Findings:  Consistent with diagnosis  PT tendon with full thickness tear measuring 3 cm in length just posterior to the medial malleolus. Hypertrophic tendon with extensive synovitis.     Complications:   None    Procedure and Technique:     Under mild sedation, the patient was brought into the operating room and placed on the operating room table in the supine position. IV sedation was achieved by anesthesia team and a universal timeout was performed where all parties are in agreement of correct patient, correct procedure and correct site. A pneumatic tourniquet was then placed over the patient's right lower extremity with ample padding. The foot was then prepped and draped in the usual aseptic manner.     Endoscopic gastrocnemius recession  Stab incision was made along medial calf just proximal to the myotendinous junction. Blunt dissection through subcutaneous tissue. [place] elevator was advanced medial to lateral, superficial to the gastrocnemius muscle. Elevator removed and obturator cannula was inserted. Stab incision made along lateral calf and obturator cannula was pushed through. Obturator removed. Camera inserted medially and gastrocnemius fascia was visualized. With foot held in dorsiflexion, knife was inserted through lateral portal and the entire extent of the fascia was transected medial to lateral. Underlying muscle belly was visualized and ankle was noted to have improved ankle dorsiflexion. All instruments removed. Flushed with nss. Skin closure with nylon.     Medial calcaneal osteotomy  An esmarch bandage was used to exsangunate the foot and the pneumatic tourniquet was then inflated to 300mmHg. An incision was made over lateral aspect of the calcaneus just anterior to the insertions of the Achilles tendon and the plantar fascia attachment. Sharp and blunt dissection through subcutaneous tissue  taking care to preserve any vital neurovascular structures. Any bleeders cauterized. Sharp dissection through periosteum which was then reflected with key elevator. Sagittal saw used to make osteotomy through the calcaneus anterior to the attachments of the Achilles tendon and plantar fascia, but posterior to the peroneal tendons. Osteotomy was made through and through from lateral to medial. Osteotome used to confirm that the cut was complete. The fragment was then shifted medial. Once valgus was corrected, 2 partially threaded headless compression screws were used to fixate the osteotomy. This was performed under C-arm.     Talonavicular fusion  Attention directed to dorsal foot where a longitudinal incision was made between course of EHL and TA tendons over the TN joint. Sharp and blunt dissection through subcutaneous tissue taking care to preserve any vital neurovascular structures. Any bleeders cauterized. Sharp dissection through periosteum which was then reflected with mcclendon elevator. Hintermann used to hold the TN joint distracted. The cartilage from both surfaces was denuded using osteotome, curette and susan. Flushed with nss. Subchondral fenestration performed. Augment injected into joint space. Hintermann removed. TN joint was reduced to rectus position, temporary fixation with wire. Meary's angle noted to be improved on C-arm. 2 dorsal staples were implanted for compression. Then, a screw was implanted over the temporary fixation wire and wire removed. Flushed with nss. Deep and subcutaneous closure obtained with vicryl. Skin closure with nylon.    Posterior tibial tendon repair  Curvilinear skin incision was made over medial ankle along the course of the posterior tendon course from posterior to the medial malleolus extending to the navicular tuberosity.  Sharp and blunt dissection through subcutaneous tissue taking care to preserve any vital neurovascular structures. Any bleeders cauterized. Sharp  "dissection through tendon sheath and flexor retinaculum. Posterior tibial tendon was visualized. On inspection, it was noted to have a full thickness tear measuring 3 cm in length just posterior to the medial malleolus. Hypertrophic tendon with extensive synovitis. Synovitis was sharply debrided, as was the full thickness tear. [place] Flexband was sutured with tension to the tendon using PDS. The tendon was then re-tubularized using vicryl. Flushed with nss. The tourniquet was deflated and normal hyperemic response was noted to all digits. The tendon sheath and flexor retinaculum was repaired with vicryl. Subcutaneous closure with vicryl. Skin closure with nylon.     All incisions were dressed with xeroform and 4x4 gauze. Posterior splint was applied with plenty of cast padding, with ankle at 90 degrees.     The patient tolerated the procedure and anesthesia well without immediate complications and transferred to PACU with vital signs stable.     Dr. Cole was present during the entire procedure and participated in all key aspects.    SIGNATURE: Allison Ngo DPM  DATE: May 9, 2025  TIME: 11:08 AM      Portions of the record may have been created with voice recognition software. Occasional wrong word or \"sound a like\" substitutions may have occurred due to the inherent limitations of voice recognition software. Read the chart carefully and recognize, using context, where substitutions have occurred.    "

## 2025-05-09 NOTE — INTERVAL H&P NOTE
H&P reviewed. After examining the patient I find no changes in the patients condition since the H&P had been written.    Vitals:    05/09/25 0729   BP: 164/97   Pulse: 93   Resp:    Temp:    SpO2: 93%

## 2025-05-09 NOTE — ANESTHESIA POSTPROCEDURE EVALUATION
Post-Op Assessment Note    CV Status:  Stable  Pain Score: 0    Pain management: adequate       Mental Status:  Alert   Hydration Status:  Stable   PONV Controlled:  Controlled   Airway Patency:  Patent     Post Op Vitals Reviewed: Yes    No anethesia notable event occurred.    Staff: CRNA           Last Filed PACU Vitals:  Vitals Value Taken Time   Temp 98    Pulse 103 05/09/25 1106   /82 05/09/25 1101   Resp 12 05/09/25 1106   SpO2 96 % 05/09/25 1106   Vitals shown include unfiled device data.

## 2025-05-09 NOTE — DISCHARGE SUMMARY
Discharge Summary Outpatient Procedure Podiatry -   Ana Roger 45 y.o. 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      Provisions for Follow-Up Care/Important appointments:  See after visit summary for information related to follow-up care and any pertinent home health orders.      Discharge Medications:  See after visit summary for reconciled discharge medications provided to patient and family.

## 2025-05-14 ENCOUNTER — OFFICE VISIT (OUTPATIENT)
Dept: PODIATRY | Facility: CLINIC | Age: 45
End: 2025-05-14

## 2025-05-14 VITALS — BODY MASS INDEX: 39.55 KG/M2 | WEIGHT: 252 LBS | HEIGHT: 67 IN

## 2025-05-14 DIAGNOSIS — M21.41 PES PLANUS OF RIGHT FOOT: ICD-10-CM

## 2025-05-14 DIAGNOSIS — M24.571 EQUINUS CONTRACTURE OF RIGHT ANKLE: ICD-10-CM

## 2025-05-14 DIAGNOSIS — M76.821 INSUFFICIENCY OF RIGHT POSTERIOR TIBIAL TENDON: Primary | ICD-10-CM

## 2025-05-14 PROCEDURE — 99024 POSTOP FOLLOW-UP VISIT: CPT | Performed by: PODIATRIST

## 2025-05-14 NOTE — ASSESSMENT & PLAN NOTE
Patient is stable postop 1 weeks    Incision: stable, no drainage from any incision however she is very swollen.  Stressed importance of elevation, ice, compression.    Instructions given to patient. Rest the foot as much as possible and elevate/ice  if swollen.    Ambulatory status: NWB to the right lower extremity    Images reviewed today: Postop images shown and discussed with patient.  No acute concerns.    RTC: 1 week

## 2025-05-14 NOTE — PROGRESS NOTES
"Name: Ana Roger      : 1980      MRN: [id number]  Encounter Provider: Jarvis Cole DPM  Encounter Date: 2025   Encounter department: [facility] PODIATRY [facility]  :  Assessment & Plan  Insufficiency of right posterior tibial tendon  Patient is stable postop 1 weeks    Incision: stable, no drainage from any incision however she is very swollen.  Stressed importance of elevation, ice, compression.    Instructions given to patient. Rest the foot as much as possible and elevate/ice  if swollen.    Ambulatory status: NWB to the right lower extremity    Images reviewed today: Postop images shown and discussed with patient.  No acute concerns.    RTC: 1 week         Pes planus of right foot         Equinus contracture of right ankle             History of Present Illness   HPI  Ana Roger is a 45 y.o. female who presents postop 1 week  DOS: 2025     Procedure: right gastroc lengthening, calcaneal osteotomy, TN fusion, repair PTT.      Condition: Dressing C/D/I. PAin is improving. Patient denies N/F/V/C/D/CP/SOB          Review of Systems       Objective   Ht 5' 7\" (1.702 m)   Wt 114 kg (252 lb)   BMI 39.47 kg/m²      Physical Exam  Vitals reviewed.     Cardiovascular:      Pulses: Normal pulses.     Musculoskeletal:         General: Swelling and tenderness present.     Skin:     Capillary Refill: Capillary refill takes less than 2 seconds.      Findings: No erythema.     Neurological:      Mental Status: She is alert.     Incisions: stable  No drainage  No calf pain  PAssive Ankle ROM without pain        "

## 2025-05-21 ENCOUNTER — OFFICE VISIT (OUTPATIENT)
Dept: PODIATRY | Facility: CLINIC | Age: 45
End: 2025-05-21

## 2025-05-21 VITALS — HEIGHT: 67 IN | BODY MASS INDEX: 39.55 KG/M2 | WEIGHT: 252 LBS

## 2025-05-21 DIAGNOSIS — M76.821 INSUFFICIENCY OF RIGHT POSTERIOR TIBIAL TENDON: Primary | ICD-10-CM

## 2025-05-21 DIAGNOSIS — M24.571 EQUINUS CONTRACTURE OF RIGHT ANKLE: ICD-10-CM

## 2025-05-21 DIAGNOSIS — M21.41 PES PLANUS OF RIGHT FOOT: ICD-10-CM

## 2025-05-21 PROCEDURE — 99024 POSTOP FOLLOW-UP VISIT: CPT | Performed by: PODIATRIST

## 2025-05-21 NOTE — ASSESSMENT & PLAN NOTE
Patient is stable postop 2 weeks    Incision: sutures removed. She has a few small blisters dorsally from swelling but no erythema. The incisions are healed. Stressed elevation, ice, compression, and rest    Instructions given to patient. Rest the foot as much as possible and elevate/ice  if swollen.    Ambulatory status: NWB    Images reviewed today: none, serial XR 4 weeks    RTC: 4 weeks    Orders:  •  Cam Boot

## 2025-05-21 NOTE — PROGRESS NOTES
"Name: Ana Roger      : 1980      MRN: [id number]  Encounter Provider: Jarvis Cole DPM  Encounter Date: 2025   Encounter department: [facility] PODIATRY [facility]  :  Assessment & Plan  Insufficiency of right posterior tibial tendon  Patient is stable postop 2 weeks    Incision: sutures removed. She has a few small blisters dorsally from swelling but no erythema. The incisions are healed. Stressed elevation, ice, compression, and rest    Instructions given to patient. Rest the foot as much as possible and elevate/ice  if swollen.    Ambulatory status: NWB    Images reviewed today: none, serial XR 4 weeks    RTC: 4 weeks    Orders:  •  Cam Boot    Pes planus of right foot    Orders:  •  Cam Boot    Equinus contracture of right ankle    Orders:  •  Cam Boot        History of Present Illness   HPI  Ana Roger is a 45 y.o. female who presents postop care  DOS: 2025     Procedure: right gastroc lengthening, calcaneal osteotomy, TN fusion, repair PTT.      Condition: Dressing C/D/I. PAin minimal. She has a lot of tingling in the foot which is fine.  Patient denies N/F/V/C/D/CP/SOB        Review of Systems       Objective   Ht 5' 7\" (1.702 m)   Wt 114 kg (252 lb)   BMI 39.47 kg/m²      Physical Exam  Vitals reviewed.     Cardiovascular:      Pulses: Normal pulses.   Pulmonary:      Effort: No respiratory distress.     Musculoskeletal:         General: Swelling (mild blister dorsal foot. No pus or cellulitis. Incisions are healed) present.     Skin:     Capillary Refill: Capillary refill takes less than 2 seconds.      Findings: Bruising present.     Neurological:      Mental Status: She is alert.      Sensory: No sensory deficit (pinprick intact to right foot. Mild parasthesia to surgical sites noted).           "

## 2025-05-27 DIAGNOSIS — L03.119 CELLULITIS OF FOOT: Primary | ICD-10-CM

## 2025-05-27 RX ORDER — CEFADROXIL 500 MG/1
500 CAPSULE ORAL EVERY 12 HOURS SCHEDULED
Qty: 14 CAPSULE | Refills: 0 | Status: SHIPPED | OUTPATIENT
Start: 2025-05-27 | End: 2025-06-03

## 2025-06-25 ENCOUNTER — APPOINTMENT (OUTPATIENT)
Dept: RADIOLOGY | Age: 45
End: 2025-06-25
Attending: PODIATRIST
Payer: COMMERCIAL

## 2025-06-25 ENCOUNTER — OFFICE VISIT (OUTPATIENT)
Dept: PODIATRY | Facility: CLINIC | Age: 45
End: 2025-06-25

## 2025-06-25 DIAGNOSIS — M24.571 EQUINUS CONTRACTURE OF RIGHT ANKLE: ICD-10-CM

## 2025-06-25 DIAGNOSIS — M21.41 PES PLANUS OF RIGHT FOOT: ICD-10-CM

## 2025-06-25 DIAGNOSIS — M76.821 INSUFFICIENCY OF RIGHT POSTERIOR TIBIAL TENDON: ICD-10-CM

## 2025-06-25 DIAGNOSIS — M76.821 INSUFFICIENCY OF RIGHT POSTERIOR TIBIAL TENDON: Primary | ICD-10-CM

## 2025-06-25 PROCEDURE — 73630 X-RAY EXAM OF FOOT: CPT

## 2025-06-25 PROCEDURE — 99024 POSTOP FOLLOW-UP VISIT: CPT | Performed by: PODIATRIST

## 2025-06-25 NOTE — PROGRESS NOTES
Name: Ana Roger      : 1980      MRN: [id number]  Encounter Provider: Jarvis Cole DPM  Encounter Date: 2025   Encounter department: [facility] PODIATRY [facility]  :  Assessment & Plan  Insufficiency of right posterior tibial tendon  Repair stable. See plan below  Orders:  •  XR foot 3+ vw right; Future    Pes planus of right foot  Stable 6 weeks. She had mild suture reaction, now dry eschar. Paint with betadyne, cover with gauze       Equinus contracture of right ankle  Stable, see below       Patient is stable postop 6 weeks    Incision: she had a suture reaction lateral heel and dorsal foot. No infection, dry eschar. Betadyne paint and cover.     Instructions given to patient. Rest the foot as much as possible and elevate/ice  if swollen.    Ambulatory status: begin WB CAM boot    Images reviewed today: stable TN fusion and calc osteotomy    RTC: 4 weeks        History of Present Illness   HPI  Ana Roger is a 45 y.o. female who presents 6 weeks postop  DOS: 2025     Procedure: right gastroc lengthening, calcaneal osteotomy, TN fusion, repair PTT.      Condition: Dressing C/D/I. PAin minimal. Her incision reacted to sutures and opened. She finished antibiotic, still some mild drainage but visible pus.       Review of Systems  As stated in HPI, otherwise normal    Medical History Reviewed by provider this encounter:  Tobacco  Allergies  Meds  Problems  Med Hx  Surg Hx  Fam Hx           Objective   There were no vitals taken for this visit.     Physical Exam  Vitals reviewed.     Cardiovascular:      Pulses: Normal pulses.     Musculoskeletal:         General: Swelling and tenderness present.     Skin:     Findings: Bruising present.     Neurological:      Mental Status: She is alert.     No sharp pain with TN stress.   Wooten test normal, no achilles pain  Edema expected  Neurovascular intact  Eschar over dorsal incision and lateral heel. The other  incisions are healed

## 2025-07-23 ENCOUNTER — APPOINTMENT (OUTPATIENT)
Dept: RADIOLOGY | Age: 45
End: 2025-07-23
Attending: PODIATRIST

## 2025-07-23 ENCOUNTER — OFFICE VISIT (OUTPATIENT)
Dept: PODIATRY | Facility: CLINIC | Age: 45
End: 2025-07-23

## 2025-07-23 DIAGNOSIS — M76.821 INSUFFICIENCY OF RIGHT POSTERIOR TIBIAL TENDON: ICD-10-CM

## 2025-07-23 DIAGNOSIS — M24.571 EQUINUS CONTRACTURE OF RIGHT ANKLE: ICD-10-CM

## 2025-07-23 DIAGNOSIS — T85.848A PAIN FROM IMPLANTED HARDWARE, INITIAL ENCOUNTER: ICD-10-CM

## 2025-07-23 DIAGNOSIS — T84.498A ARTHRODESIS MALUNION, INITIAL ENCOUNTER (HCC): ICD-10-CM

## 2025-07-23 DIAGNOSIS — M76.821 INSUFFICIENCY OF RIGHT POSTERIOR TIBIAL TENDON: Primary | ICD-10-CM

## 2025-07-23 DIAGNOSIS — M21.41 PES PLANUS OF RIGHT FOOT: ICD-10-CM

## 2025-07-23 PROCEDURE — 99024 POSTOP FOLLOW-UP VISIT: CPT | Performed by: PODIATRIST

## 2025-07-23 PROCEDURE — 73630 X-RAY EXAM OF FOOT: CPT

## 2025-07-23 NOTE — ASSESSMENT & PLAN NOTE
She has no PTT pain or weakness with inversion. If the CT shows fusion on TN, she can begin PT to rehab the tendon. For now, stay in CAM, reduce activity  Orders:  •  XR foot 3+ vw right; Future

## 2025-07-23 NOTE — PROGRESS NOTES
Name: Ana Roger      : 1980      MRN: [id number]  Encounter Provider: Jarvis Cole DPM  Encounter Date: 2025   Encounter department: [facility] PODIATRY [facility]  :  Assessment & Plan  Insufficiency of right posterior tibial tendon  She has no PTT pain or weakness with inversion. If the CT shows fusion on TN, she can begin PT to rehab the tendon. For now, stay in CAM, reduce activity  Orders:  •  XR foot 3+ vw right; Future    Pain from implanted hardware, initial encounter  Patient lateral TN staple is broken on both forks. I am ordering a CT scan to check in there is delayed union of her TN fusion. The medial fusion looks fine on XR. The calcaneal osteotomy is healed.   Orders:  •  CT lower extremity wo contrast right; Future    Equinus contracture of right ankle  Healed, no issues with the achilles. 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 The medial staple and screw appear to be fine with evidence of osseous fusion.   Orders:  •  CT lower extremity wo contrast right; Future    XRay 3 views of the right foot personally read by Dr. Cole in office today and discussed with patient:    The lateral TN arthrodesis staple is broken, this is a new finding  THe medial staple and screw are stable. There appears to be fusion across the TN joint central and medial.   Calcaneal osteotomy and hardware is stable      History of Present Illness   HPI  Ana Roger is a 45 y.o. female who presents postop  DOS: 2025     Procedure: right foot TN arthrodesis, repair PTT with graft, calcaneal slide osteotomy and gastroc lengthening.      Condition: Dressing C/D/I. She had a suture reaction and the dorsal incision is still not healed, getting smaller. SHe is walking in her CAM boot. Some days the foot feels fine, she's getting ache up  the anterior ankle (points along her extensor tendons). The incisions are not healed due to her suture reaction but she sends me weekly pictures, they are slowly improving. Patient denies N/F/V/C/D/CP/SOB          Review of Systems       Objective   There were no vitals taken for this visit.     Physical Exam  Vitals reviewed.     Cardiovascular:      Pulses: Normal pulses.     Skin:     Capillary Refill: Capillary refill takes less than 2 seconds.     Neurological:      Mental Status: She is alert.      Sensory: No sensory deficit.       PTT exam: no pain medial ankle with stressed inversion. SHe has normal PTT strength. There is tenderness of EHL, EDL with stressed DF, no weakness. Likely CAM boot related    No pain with stress to TN joint. NO calcaneal pain with medial/lateral squeeze. Ankle can DF 5 degrees past 90.     Dorsal midfoot incision is fibrotic. No probe through dermis. NO cellultiis. Measures 2.5x2x0.2cm.   Posterior heel wound 0,5x0.5cm, fibrotic, no probe through dermis  PTT incision is healed, lateral heel incision is healed.

## 2025-07-31 ENCOUNTER — HOSPITAL ENCOUNTER (OUTPATIENT)
Dept: CT IMAGING | Facility: HOSPITAL | Age: 45
Discharge: HOME | End: 2025-07-31
Attending: PODIATRIST
Payer: COMMERCIAL

## 2025-07-31 DIAGNOSIS — T84.498A ARTHRODESIS MALUNION, INITIAL ENCOUNTER (HCC): ICD-10-CM

## 2025-07-31 DIAGNOSIS — T85.848A PAIN FROM IMPLANTED HARDWARE, INITIAL ENCOUNTER: ICD-10-CM

## 2025-07-31 PROCEDURE — 73700 CT LOWER EXTREMITY W/O DYE: CPT

## 2025-08-06 DIAGNOSIS — M76.821 INSUFFICIENCY OF RIGHT POSTERIOR TIBIAL TENDON: Primary | ICD-10-CM

## 2025-08-06 DIAGNOSIS — M21.41 PES PLANUS OF RIGHT FOOT: ICD-10-CM

## 2025-08-06 DIAGNOSIS — M24.571 EQUINUS CONTRACTURE OF RIGHT ANKLE: ICD-10-CM

## 2025-08-18 ENCOUNTER — EVALUATION (OUTPATIENT)
Dept: PHYSICAL THERAPY | Facility: CLINIC | Age: 45
End: 2025-08-18
Attending: PODIATRIST

## 2025-08-18 DIAGNOSIS — M24.571 EQUINUS CONTRACTURE OF RIGHT ANKLE: ICD-10-CM

## 2025-08-18 DIAGNOSIS — M76.821 INSUFFICIENCY OF RIGHT POSTERIOR TIBIAL TENDON: Primary | ICD-10-CM

## 2025-08-18 DIAGNOSIS — M21.41 PES PLANUS OF RIGHT FOOT: ICD-10-CM

## 2025-08-18 PROCEDURE — 97162 PT EVAL MOD COMPLEX 30 MIN: CPT | Performed by: PHYSICAL THERAPIST

## 2025-08-20 ENCOUNTER — OFFICE VISIT (OUTPATIENT)
Dept: PODIATRY | Facility: CLINIC | Age: 45
End: 2025-08-20

## 2025-08-20 VITALS — WEIGHT: 252 LBS | BODY MASS INDEX: 39.55 KG/M2 | HEIGHT: 67 IN

## 2025-08-20 DIAGNOSIS — M76.821 INSUFFICIENCY OF RIGHT POSTERIOR TIBIAL TENDON: ICD-10-CM

## 2025-08-20 DIAGNOSIS — M21.41 PES PLANUS OF RIGHT FOOT: Primary | ICD-10-CM

## 2025-08-20 PROCEDURE — 99024 POSTOP FOLLOW-UP VISIT: CPT | Performed by: PODIATRIST

## (undated) DEVICE — GLOVE INDICATOR PI UNDERGLOVE SZ 8 BLUE

## (undated) DEVICE — BASIC SINGLE BASIN-LF: Brand: MEDLINE INDUSTRIES, INC.

## (undated) DEVICE — INTENDED FOR TISSUE SEPARATION, AND OTHER PROCEDURES THAT REQUIRE A SHARP SURGICAL BLADE TO PUNCTURE OR CUT.: Brand: BARD-PARKER ® SAFETYLOCK CARBON RIB-BACK BLADES

## (undated) DEVICE — CUFF TOURNIQUET 30 X 4 IN QUICK CONNECT DISP 1BLA

## (undated) DEVICE — STERILE POLYISOPRENE POWDER-FREE SURGICAL GLOVES: Brand: PROTEXIS

## (undated) DEVICE — THIN OFFSET (5.5 X 0.38 X 25.0MM)

## (undated) DEVICE — SUT PDS II 2-0 CT-2 27 IN Z333H

## (undated) DEVICE — TUBE SURGICAL IRRIGATION

## (undated) DEVICE — NEEDLE 25G X 1 1/2

## (undated) DEVICE — SCULPTING BURR: Brand: PROSTEP MIS ARTHRODESIS

## (undated) DEVICE — PADDING CAST 4 IN  COTTON STRL

## (undated) DEVICE — UNTHREADED GUIDE WIRE: Brand: FIXOS

## (undated) DEVICE — SUT ETHILON 3-0 PS-1 18 IN 1663H

## (undated) DEVICE — UNTHREADED GUIDE WIRE
Type: IMPLANTABLE DEVICE | Site: HEEL | Status: NON-FUNCTIONAL
Brand: FIXOS
Removed: 2025-05-09

## (undated) DEVICE — BETHLEHEM UNIVERSAL  MIONR EXT: Brand: CARDINAL HEALTH

## (undated) DEVICE — SUT ETHILON 4-0 PS-2 18 IN 1667H

## (undated) DEVICE — GAUZE SPONGES,16 PLY: Brand: CURITY

## (undated) DEVICE — JOINT PREP INSTRUMENT KIT: Brand: ORTHOLOC™ 2

## (undated) DEVICE — EPF KIT: Brand: ENDOTRAC

## (undated) DEVICE — PAD CAST 6 IN COTTON NON STERILE

## (undated) DEVICE — COTTON TIP APPLICTOR 2 PK

## (undated) DEVICE — GLOVE INDICATOR PI UNDERGLOVE SZ 7 BLUE

## (undated) DEVICE — STEINMANN PIN, SMOOTH
Type: IMPLANTABLE DEVICE | Site: HEEL | Status: NON-FUNCTIONAL
Brand: VARIAX
Removed: 2025-05-09

## (undated) DEVICE — SINGLE PORT MANIFOLD: Brand: NEPTUNE 2

## (undated) DEVICE — DISPOSABLE OR TOWEL: Brand: CARDINAL HEALTH

## (undated) DEVICE — DRAPE C-ARMOUR

## (undated) DEVICE — NEPTUNE E-SEP SMOKE EVACUATION PENCIL, COATED, 70MM BLADE, PUSH BUTTON SWITCH: Brand: NEPTUNE E-SEP

## (undated) DEVICE — STERILE POLYISOPRENE POWDER-FREE SURGICAL GLOVES WITH EMOLLIENT COATING: Brand: PROTEXIS

## (undated) DEVICE — STOCKINETTE 2P PREROLLD 6X60

## (undated) DEVICE — ASTOUND STANDARD SURGICAL GOWN, XL: Brand: CONVERTORS

## (undated) DEVICE — REM POLYHESIVE ADULT PATIENT RETURN ELECTRODE: Brand: VALLEYLAB

## (undated) DEVICE — KERLIX BANDAGE ROLL: Brand: KERLIX

## (undated) DEVICE — DYNAMIC COMPRESSION SYSTEM: Brand: EASYFUSE

## (undated) DEVICE — GLOVE PI ULTRA TOUCH SZ.7.5

## (undated) DEVICE — ACE WRAP 6 IN UNSTERILE

## (undated) DEVICE — ACE WRAP 4 IN UNSTERILE

## (undated) DEVICE — OCCLUSIVE GAUZE STRIP,3% BISMUTH TRIBROMOPHENATE IN PETROLATUM BLEND: Brand: XEROFORM

## (undated) DEVICE — C-ARM: Brand: UNBRANDED

## (undated) DEVICE — GLOVE INDICATOR PI UNDERGLOVE SZ 6.5 BLUE

## (undated) DEVICE — WEBRIL 6 IN UNSTERILE

## (undated) DEVICE — SCD SEQUENTIAL COMPRESSION COMFORT SLEEVE MEDIUM KNEE LENGTH: Brand: KENDALL SCD

## (undated) DEVICE — ANTIBACTERIAL UNDYED BRAIDED (POLYGLACTIN 910), SYNTHETIC ABSORBABLE SUTURE: Brand: COATED VICRYL

## (undated) DEVICE — CHLORAPREP HI-LITE 26ML ORANGE

## (undated) DEVICE — CURITY STRETCH BANDAGE: Brand: CURITY

## (undated) DEVICE — GLOVE PI ULTRA TOUCH SZ.6.5